# Patient Record
Sex: MALE | Race: WHITE | URBAN - METROPOLITAN AREA
[De-identification: names, ages, dates, MRNs, and addresses within clinical notes are randomized per-mention and may not be internally consistent; named-entity substitution may affect disease eponyms.]

---

## 2017-01-06 DIAGNOSIS — J30.9 ALLERGIC RHINITIS: Primary | ICD-10-CM

## 2017-01-06 RX ORDER — CETIRIZINE HYDROCHLORIDE 10 MG/1
10 TABLET ORAL EVERY EVENING
Qty: 90 TABLET | Refills: 2 | Status: CANCELLED | OUTPATIENT
Start: 2017-01-06

## 2017-01-06 NOTE — TELEPHONE ENCOUNTER
zyrtec      Last Written Prescription Date: 07/18/16  Last Fill Quantity: 90,  # refills: 2   Last Office Visit with [facility], [facility] or [facility] prescribing provider: 08/09/16                                         Next 5 appointments (look out 90 days)     Pino 10, 2017 11:30 AM   Return Visit with Ozzy Delvalle MD   [facility]    [address] [place] [id number]   [phone]            Mar 20, 2017  3:40 PM   PHYSICAL with Susie Cordova MD   [facility]    [address] [place] [address] [address]  [facility] [id number] [phone]

## 2017-01-17 ENCOUNTER — TELEPHONE (OUTPATIENT)
Dept: FAMILY MEDICINE | Facility: OTHER | Age: 22
End: 2017-01-17

## 2017-01-17 NOTE — TELEPHONE ENCOUNTER
Logan Pittman is a 21 year old male-   Evangelina calls from patient's group home requesting a copy of patient's recent allergy testing. Per note in chart, results were mailed to group home, but she states that they haven't received anything. This testing was completed on 12/21/2016.   Please fax all allergy results to [phone].  Attn: Evangelina  Thank you,   Yennifer Templeton RN

## 2017-01-17 NOTE — TELEPHONE ENCOUNTER
Reason for call:  Patient reporting a symptom    Symptom or request: Back Pain    Duration (how long have symptoms been present): 1/14/2017    Have you been treated for this before? No    Additional comments: Pt group home representative states pt is requesting to be seen by a Chiropractic office due to back pain.  Inquiring if pt needs a referral or needs to be seen by PCP first.  PT states he slept wrong.    Phone Number patient can be reached at:  Other phone number: [phone]-Evangelina at [facility]     Best Time:  7am-3pm Monday-Friday    Can we leave a detailed message on this number:  YES    Call taken on 1/17/2017 at 11:23 AM by Azul Bronson

## 2017-01-17 NOTE — TELEPHONE ENCOUNTER
"Logan Pittman is a 21 year old male-     NURSING ASSESSMENT:  Description:  Evangelina from patient's group home calls. Consent on file to communicate with her is in chart. Patient complaining this am of lower back pain. He states that he \"slept wrong\" last night. He is up walking and doing things as usual without difficulty. He has not requested anything OTC for this pain, but mentioned that he would like to go to a chiropractor. Evangelina calls wondering if he needs a referral. Patient denies chest pain, difficulty breathing or severe back pain. Onset/duration:  1 day   Precip. factors:  Unknown   Associated symptoms:  None   Pain scale (0-10)  Patient does not rate   Last exam/Treatment:  8/9/2016  Allergies:   Allergies   Allergen Reactions     Cats      Food      Potatoes, most raw vegetables     Peanuts [Nuts]      Seasonal Allergies      NURSING PLAN: Nursing advice to patient Contact insurance to see if referral is required. Will send to provider if needed.     RECOMMENDED DISPOSITION:  Home care advice - *Light activities for 2 to 3 days.  *Take your usual pain medication for discomfort and follow instructions on the label.  *Use moist heat (shower tub, moist hot towels) for 20 to 30 min every 2 hours for 48 hours but only while awake.  REPORT THE FOLLOW PROBLEMS TO YOUR PCP?CLINIC?ED  *No improvement in 3 days.  *Pain worsens *pain radiates into any limb  *Painful urination, frequency, fever, or blood in urine    SEEK EMERGENCY CARE IMMEDIATELY IF ANY OF THE FOLLOWING OCCUR  *New onset of persistent numbness.tingling or persistent loss of bowel of bladder control or inability to urinate  *Weakness in the limbs  *Severe pain  Will comply with recommendation: Yes  If further questions/concerns or if symptoms do not improve, worsen or new symptoms develop, call your PCP or [facility] as soon as possible.      Guideline used: Back Pain   Telephone Triage Protocols for Nurses, Fourth Edition, Leti " Danielito Templeton RN

## 2017-01-24 ENCOUNTER — OFFICE VISIT (OUTPATIENT)
Dept: CARDIOLOGY | Facility: CLINIC | Age: 22
End: 2017-01-24
Attending: INTERNAL MEDICINE
Payer: MEDICAID

## 2017-01-24 VITALS
SYSTOLIC BLOOD PRESSURE: 136 MMHG | BODY MASS INDEX: 29.36 KG/M2 | WEIGHT: 176.21 LBS | OXYGEN SATURATION: 97 % | HEIGHT: 65 IN | HEART RATE: 100 BPM | DIASTOLIC BLOOD PRESSURE: 86 MMHG

## 2017-01-24 DIAGNOSIS — I42.9 SECONDARY CARDIOMYOPATHY (H): ICD-10-CM

## 2017-01-24 DIAGNOSIS — K30 ACID INDIGESTION: Primary | ICD-10-CM

## 2017-01-24 PROCEDURE — 99214 OFFICE O/P EST MOD 30 MIN: CPT | Performed by: INTERNAL MEDICINE

## 2017-01-24 NOTE — Clinical Note
2017      Susie Cordova MD    [facility]    [address], [address]    [place]  [address]       RE: Logan Pittman   MRN: [id number]   : 1995      Dear Susie:      Logan came with one of his aides to follow up his history of perimyocarditis.  It is not exactly clear why he came, but for all that he has been asymptomatic in the past year with nothing to suggest recurrent pericarditis.  He has had a burning feeling in the middle of his chest that comes and goes and is altered by meals and for all effects and purposes sounds like indigestion.  I gave him a prescription for Prilosec 20 mg a day but asked him to follow up with you subsequently.  The symptoms sounded very much like indigestion and heartburn and was improved by eating food.      PHYSICAL EXAMINATION:     GENERAL:  His physical exam is entirely normal.  He looks well and feels well.   VITAL SIGNS:  Blood pressure 136/86, heart rate 90 beats a minute.   HEAD AND NECK:  Normal.  Carotids were equal, no bruits heard.   HEART:  Regular without gallop, murmur, rub or click.   LUNGS:  Clear.   ABDOMEN:  Soft without organomegaly, mass or pain.   EXTREMITIES:  Free of edema.      Logan looks well and is asymptomatic.  I do not feel further followup with regards to Cardiology, perimyocarditis is needed.      I did give him the Prilosec prescription, and he should follow with you regarding ongoing medication of this type.      If he has any problems, let me know.      Thanks for the privilege.  I will see him p.r.n.      Sincerely,      Ozzy Delvalle MD, [facility]

## 2017-01-24 NOTE — MR AVS SNAPSHOT
"              After Visit Summary   1/24/2017    Logan Pittman    MRN: [id number]           Patient Information     Date Of Birth          1995        Visit Information        Provider Department      1/24/2017 1:00 PM Ozzy Delvalle MD [facility]        Today's Diagnoses     Acid indigestion    -  1     Secondary cardiomyopathy (H)            Follow-ups after your visit        Your next 10 appointments already scheduled     Mar 20, 2017  3:40 PM   PHYSICAL with Susie Cordova MD   [facility]    [address] [address] [phone]              Who to contact     If you have questions or need follow up information about today's clinic visit or your schedule please contact [facility] directly at [phone].  Normal or non-critical lab and imaging results will be communicated to you by MyChart, letter or phone within 4 business days after the clinic has received the results. If you do not hear from us within 7 days, please contact the clinic through MyChart or phone. If you have a critical or abnormal lab result, we will notify you by phone as soon as possible.  Submit refill requests through [employer] or call your pharmacy and they will forward the refill request to us. Please allow 3 business days for your refill to be completed.          Additional Information About Your Visit        MyChart Information     [employer] lets you send messages to your doctor, view your test results, renew your prescriptions, schedule appointments and more. To sign up, go to [email or web address] . Click on \"Log in\" on the left side of the screen, which will take you to the Welcome page. Then click on \"Sign up Now\" on the right side of the page.     You will be asked to enter the access code listed below, as well as some personal information. Please follow the directions to create your username and password.     Your access " "code is: [email or web address]  Expires: 3/21/2017  2:47 PM     Your access code will  in 90 days. If you need help or a new code, please call your [place] clinic or [phone].        Care EveryWhere ID     This is your Care EveryWhere ID. This could be used by other organizations to access your [place] medical records  [id number]        Your Vitals Were     Pulse Height BMI (Body Mass Index) Pulse Oximetry          100 1.642 m (5' 4.65\") 29.65 kg/m2 97%         Blood Pressure from Last 3 Encounters:   17 136/86   16 118/76   16 108/50    Weight from Last 3 Encounters:   17 79.928 kg (176 lb 3.4 oz)   16 77.111 kg (170 lb)   16 73.029 kg (161 lb)              We Performed the Following     Follow-Up with Cardiologist          Today's Medication Changes          These changes are accurate as of: 17  1:40 PM.  If you have any questions, ask your nurse or doctor.               Start taking these medicines.        Dose/Directions    omeprazole 20 MG CR capsule   Commonly known as:  priLOSEC   Used for:  Acid indigestion        Dose:  20 mg   Take 1 capsule (20 mg) by mouth daily   Quantity:  30 capsule   Refills:  11            Where to get your medicines      These medications were sent to [facility] [address]  [address] [place] [address] [address], [facility] [address]     Phone:  [phone]    - omeprazole 20 MG CR capsule             Primary Care Provider Office Phone # Fax #    Susie Cordova [phone]       [address]    [facility] [address]        Thank you!     Thank you for choosing [facility]  for your care. Our goal is always to provide you with excellent care. Hearing back from our patients is one way we can continue to improve our services. Please take a few minutes to complete the written survey that you may receive in the mail after your visit with us. Thank " you!             Your Updated Medication List - Protect others around you: Learn how to safely use, store and throw away your medicines at www.disposemymeds.org.          This list is accurate as of: 1/24/17  1:40 PM.  Always use your most recent med list.                   Brand Name Dispense Instructions for use    albuterol 108 (90 BASE) MCG/ACT Inhaler    PROAIR HFA/PROVENTIL HFA/VENTOLIN HFA    1 Inhaler    Inhale 2 puffs into the lungs every 4 hours as needed for shortness of breath / dyspnea or wheezing (cough or chest tightness)       cetirizine 10 MG tablet    zyrTEC    90 tablet    Take 1 tablet (10 mg) by mouth every evening       diphenhydrAMINE 25 MG tablet    BENADRYL    30 tablet    Take 1 tablet (25 mg) by mouth every 6 hours as needed (allergic reaction)       EPINEPHrine 0.3 MG/0.3ML injection     2 each    Inject 0.3 mLs (0.3 mg) into the muscle once as needed for anaphylaxis       fluticasone 50 MCG/ACT spray    FLONASE    3 Package    Spray 2 sprays into both nostrils as needed for rhinitis or allergies       HYDROXYZINE HCL PO      Take 50 mg by mouth       methylphenidate 20 MG CR tablet    METADATE ER    90 tablet    Take 3 tablets (60 mg) by mouth every morning       olopatadine 0.1 % ophthalmic solution    PATANOL    5 mL    Place 1 drop into both eyes 2 times daily       omeprazole 20 MG CR capsule    priLOSEC    30 capsule    Take 1 capsule (20 mg) by mouth daily       order for DME      Equipment being ordered: post op shoe       * SEROQUEL PO      Take 50 mg by mouth At Bedtime       * SEROQUEL 25 MG tablet   Generic drug:  QUEtiapine     30 tablet    Take 1 tablet (25 mg) by mouth nightly as needed       WELLBUTRIN PO      Take 450 mg by mouth daily       ZOLOFT PO      Take 150 mg by mouth daily       * Notice:  This list has 2 medication(s) that are the same as other medications prescribed for you. Read the directions carefully, and ask your doctor or other care provider to review  them with you.

## 2017-01-24 NOTE — PROGRESS NOTES
HPI and Plan:   See dictation        No orders of the defined types were placed in this encounter.     Orders Placed This Encounter   Medications     BuPROPion HCl (WELLBUTRIN PO)     Sig: Take 450 mg by mouth daily     QUEtiapine Fumarate (SEROQUEL PO)     Sig: Take 50 mg by mouth At Bedtime     Sertraline HCl (ZOLOFT PO)     Sig: Take 150 mg by mouth daily     HYDROXYZINE HCL PO     Sig: Take 50 mg by mouth     omeprazole (PRILOSEC) 20 MG CR capsule     Sig: Take 1 capsule (20 mg) by mouth daily     Dispense:  30 capsule     Refill:  11     Medications Discontinued During This Encounter   Medication Reason     FLUoxetine (PROZAC) 20 MG capsule Stopped by Patient         Encounter Diagnoses   Name Primary?     Secondary cardiomyopathy (H)      Acid indigestion Yes       CURRENT MEDICATIONS:  Current Outpatient Prescriptions   Medication Sig Dispense Refill     BuPROPion HCl (WELLBUTRIN PO) Take 450 mg by mouth daily       QUEtiapine Fumarate (SEROQUEL PO) Take 50 mg by mouth At Bedtime       Sertraline HCl (ZOLOFT PO) Take 150 mg by mouth daily       HYDROXYZINE HCL PO Take 50 mg by mouth       omeprazole (PRILOSEC) 20 MG CR capsule Take 1 capsule (20 mg) by mouth daily 30 capsule 11     cetirizine (ZYRTEC) 10 MG tablet Take 1 tablet (10 mg) by mouth every evening 90 tablet 2     fluticasone (FLONASE) 50 MCG/ACT nasal spray Spray 2 sprays into both nostrils as needed for rhinitis or allergies 3 Package 3     olopatadine (PATANOL) 0.1 % ophthalmic solution Place 1 drop into both eyes 2 times daily 5 mL 11     diphenhydrAMINE (BENADRYL) 25 MG tablet Take 1 tablet (25 mg) by mouth every 6 hours as needed (allergic reaction) 30 tablet 2     EPINEPHrine (EPIPEN) 0.3 MG/0.3ML injection Inject 0.3 mLs (0.3 mg) into the muscle once as needed for anaphylaxis 2 each 2     albuterol (PROAIR HFA, PROVENTIL HFA, VENTOLIN HFA) 108 (90 BASE) MCG/ACT inhaler Inhale 2 puffs into the lungs every 4 hours as needed for shortness of  breath / dyspnea or wheezing (cough or chest tightness) 1 Inhaler 2     QUEtiapine (SEROQUEL) 25 MG tablet Take 1 tablet (25 mg) by mouth nightly as needed 30 tablet 1     methylphenidate (METADATE ER) 20 MG ER tablet Take 3 tablets (60 mg) by mouth every morning (Patient taking differently: Take 60 mg by mouth every morning ) 90 tablet 0     ORDER FOR DME Equipment being ordered: post op shoe         ALLERGIES     Allergies   Allergen Reactions     Cats      Dogs      Food      Potatoes, most raw vegetables     Peanuts [Nuts]      Seasonal Allergies        PAST MEDICAL HISTORY:  Past Medical History   Diagnosis Date     ADHD      Asperger's syndrome      Mild intermittent asthma      Triggers = environmental (chlorine), seasonal allergies     Food allergies      Multiple, skin and blood testing positive, Dr. Ponce follows     Kidney stone Age 8     Hospitalized for 1 week     Pericarditis 2/15       PAST SURGICAL HISTORY:  Past Surgical History   Procedure Laterality Date     Appendectomy  2003     Hc orchiopexy,ingunial approach  Age 8     with bilateral hernia repair and hydrocele repair       FAMILY HISTORY:  No family history on file.    SOCIAL HISTORY:  Social History     Social History     Marital Status: Single     Spouse Name: N/A     Number of Children: N/A     Years of Education: N/A     Social History Main Topics     Smoking status: Current Every Day Smoker -- 0.25 packs/day     Smokeless tobacco: Never Used      Comment: rolls his own     Alcohol Use: No     Drug Use: No     Sexual Activity:     Partners: Female     Birth Control/ Protection: Condom     Other Topics Concern     Not on file     Social History Narrative         Review of Systems:  Skin:  Negative       Eyes:  Negative      ENT:  Negative      Respiratory:  Negative       Cardiovascular:  Negative for;palpitations;edema;lightheadedness;dizziness Positive for;chest pain throw up feeling and burning thinks heart burn   Gastroenterology:  "Positive for heartburn    Genitourinary:  Negative      Musculoskeletal:  Positive for back pain trying to get into the chiropractor  Neurologic:  Negative      Psychiatric:  Positive for sleep disturbances medications are helping   Heme/Lymph/Imm:  Positive for allergies    Endocrine:  Negative        Physical Exam:  Vitals: /86 mmHg  Pulse 100  Ht 1.642 m (5' 4.65\")  Wt 79.928 kg (176 lb 3.4 oz)  BMI 29.65 kg/m2  SpO2 97%    Constitutional:  cooperative, alert and oriented, well developed, well nourished, in no acute distress        Skin:  warm and dry to the touch        Head:  normocephalic        Eyes:  pupils equal and round;sclera white        ENT:  no pallor or cyanosis        Neck:  carotid pulses are full and equal bilaterally;JVP normal;no carotid bruit        Chest:  clear to auscultation          Cardiac: regular rhythm;normal S1 and S2;no murmurs, gallops or rubs detected;no S3 or S4                  Abdomen:  abdomen soft;no masses;non-tender        Vascular: pulses full and equal                                        Extremities and Back:  no deformities, clubbing, cyanosis, erythema observed              Neurological:  affect appropriate, oriented to time, person and place;no gross motor deficits          Recent Lab Results:  LIPID RESULTS:  Lab Results   Component Value Date    CHOL 171 02/23/2015    HDL 40* 02/23/2015     02/23/2015    TRIG 138 02/23/2015    CHOLHDLRATIO 4.2 02/23/2015       LIVER ENZYME RESULTS:  Lab Results   Component Value Date    AST 34 04/23/2015    * 04/23/2015       CBC RESULTS:  Lab Results   Component Value Date    WBC 11.1* 04/23/2015    RBC 5.28 04/23/2015    HGB 15.4 04/23/2015    HCT 44.6 04/23/2015    MCV 85 04/23/2015    MCH 29.2 04/23/2015    MCHC 34.5 04/23/2015    RDW 12.9 04/23/2015     04/23/2015       BMP RESULTS:  Lab Results   Component Value Date     04/23/2015    POTASSIUM 3.7 04/23/2015    CHLORIDE 103 04/23/2015    " CO2 27 04/23/2015    ANIONGAP 7 04/23/2015    GLC 92 04/23/2015    BUN 16 04/23/2015    CR 0.79 04/23/2015    GFRESTIMATED >90  Non  GFR Calc   04/23/2015    GFRESTBLACK >90   GFR Calc   04/23/2015    RUSS 8.8 04/23/2015        A1C RESULTS:  No results found for: A1C    INR RESULTS:  No results found for: INR        CC  Ozzy Delvalle MD   [facility]  [address]  [place] [id number]

## 2017-01-25 NOTE — PROGRESS NOTES
SUBJECTIVE:                                                    Logan Pittman is a 22 year old male who presents to clinic today for the following health issues:      HPI    1) split on lip    GERD/Heartburn     Onset: x 1.5 years      Description:     Burning in chest: YES    Intensity: mild, moderate, severe    Progression of Symptoms: waxing and waning    Accompanying Signs & Symptoms:  Does it feel like food gets stuck: YES  Nausea: no   Vomiting (bloody?): no  Abdominal Pain: no  Black-Tarry stools: no:  Bloody stools: no   History:   Previous ulcers: no    Precipitating factors:   Caffeine use: no  Alcohol use: no  NSAID/Aspirin use: YES- I take it for headaches   Tobacco use: YES  Worse with peppermint and sour foods.    Alleviating factors:  Greasy stuff          Therapies Tried and outcome:liquid antacid    Acute low back pain - 1 week.   Problem list and histories reviewed & adjusted, as indicated.  Additional history: as documented      Patient Active Problem List   Diagnosis     Intermittent asthma, uncomplicated     Food allergies     ADHD (attention deficit hyperactivity disorder)     Asperger's syndrome     Allergic rhinitis     Seasonal allergic rhinitis, unspecified allergic rhinitis trigger     Tobacco abuse     OCD (obsessive compulsive disorder)     H/O pericarditis     Secondary cardiomyopathy (H)     Peanut allergy     Oral allergy syndrome, initial encounter     Gastroesophageal reflux disease without esophagitis     Muscle spasm     Past Surgical History   Procedure Laterality Date     Appendectomy  2003     Hc orchiopexy,ingunial approach  Age 8     with bilateral hernia repair and hydrocele repair       Social History   Substance Use Topics     Smoking status: Current Every Day Smoker -- 0.25 packs/day     Smokeless tobacco: Never Used      Comment: rolls his own     Alcohol Use: No     History reviewed. No pertinent family history.      Current Outpatient Prescriptions   Medication Sig  Dispense Refill     BuPROPion HCl (WELLBUTRIN PO) Take 450 mg by mouth daily       QUEtiapine Fumarate (SEROQUEL PO) Take 50 mg by mouth At Bedtime       Sertraline HCl (ZOLOFT PO) Take 150 mg by mouth daily       HYDROXYZINE HCL PO Take 50 mg by mouth       omeprazole (PRILOSEC) 20 MG CR capsule Take 1 capsule (20 mg) by mouth daily 30 capsule 11     cetirizine (ZYRTEC) 10 MG tablet Take 1 tablet (10 mg) by mouth every evening 90 tablet 2     fluticasone (FLONASE) 50 MCG/ACT nasal spray Spray 2 sprays into both nostrils as needed for rhinitis or allergies 3 Package 3     olopatadine (PATANOL) 0.1 % ophthalmic solution Place 1 drop into both eyes 2 times daily 5 mL 11     diphenhydrAMINE (BENADRYL) 25 MG tablet Take 1 tablet (25 mg) by mouth every 6 hours as needed (allergic reaction) 30 tablet 2     QUEtiapine (SEROQUEL) 25 MG tablet Take 1 tablet (25 mg) by mouth nightly as needed 30 tablet 1     methylphenidate (METADATE ER) 20 MG ER tablet Take 3 tablets (60 mg) by mouth every morning (Patient taking differently: Take 60 mg by mouth every morning ) 90 tablet 0     EPINEPHrine (EPIPEN) 0.3 MG/0.3ML injection Inject 0.3 mLs (0.3 mg) into the muscle once as needed for anaphylaxis 2 each 2     albuterol (PROAIR HFA, PROVENTIL HFA, VENTOLIN HFA) 108 (90 BASE) MCG/ACT inhaler Inhale 2 puffs into the lungs every 4 hours as needed for shortness of breath / dyspnea or wheezing (cough or chest tightness) 1 Inhaler 2     ORDER FOR DME Equipment being ordered: post op shoe       Allergies   Allergen Reactions     Cats      Dogs      Food      Potatoes, most raw vegetables     Peanuts [Nuts]      Seasonal Allergies      BP Readings from Last 3 Encounters:   01/26/17 100/60   01/24/17 136/86   12/21/16 118/76    Wt Readings from Last 3 Encounters:   01/26/17 175 lb (79.379 kg)   01/24/17 176 lb 3.4 oz (79.928 kg)   12/21/16 170 lb (77.111 kg)                  Labs reviewed in EPIC  Problem list, Medication list, Allergies,  and Medical/Social/Surgical histories reviewed in [facility] and updated as appropriate.    ROS:  Constitutional, HEENT, cardiovascular, pulmonary, gi and gu systems are negative, except as otherwise noted.    OBJECTIVE:                                                    /60 mmHg  Pulse 90  Temp(Src) 98  F (36.7  C) (Temporal)  Resp 14  Ht   Wt 175 lb (79.379 kg)  Body mass index is 29.44 kg/(m^2).  Physical Exam   Constitutional: He appears well-developed and well-nourished.   HENT:   Head: Normocephalic and atraumatic.   Cardiovascular: Normal rate and regular rhythm.    Pulmonary/Chest: Effort normal and breath sounds normal.   Abdominal: Soft. Bowel sounds are normal. He exhibits no distension and no mass. There is no tenderness. There is no rebound and no guarding.   Musculoskeletal:   egative straight leg price test. Normal reflexes and strength in bilateral lower extremities. Tenderness to palpation rightparaspinal muscles   Psychiatric: He has a normal mood and affect.         Diagnostic Test Results:  none      ASSESSMENT/PLAN:                                                      Problem List Items Addressed This Visit     Gastroesophageal reflux disease without esophagitis     Symptoms are very consistent with gastric reflux  Disease.  Current precipitating factors include tobacco use, obesity, eating habits  Discussed diet and lifestyle modifications.  He is not interested in quitting smoking today.  ContinuePPI for the next 6-12 weeks.  Recheckin 2 months for a physical.         Muscle spasm     Patient is also here to discuss low back pain  Which has been present for about a week now.  Denies any current injuries.  Exam is consistent with acute muscle spasm of the  Paraspinal muscles in the lumbar area  Discussed heat, low back stretches, Tylenol as needed for pain    Offered muscle relaxants. Patient declined.  Reportable signs and symptoms discussed.  We'll evaluate further if symptoms persist or  fail to resolve           Other Visit Diagnoses     Tobacco use disorder    -  Primary     Relevant Orders     TOBACCO CESSATION ORDER FOR [facility] (Completed)     Need for prophylactic vaccination with tetanus-diphtheria (TD)                Tobacco Cessation:   reports that he has been smoking.  He has never used smokeless tobacco.  Tobacco Cessation Action Plan: Information offered: Patient not interested at this time      See Patient Instructions    Susie Cordova MD  [facility]

## 2017-01-25 NOTE — PROGRESS NOTES
2017      Susie Cordova MD    [facility]    [address], [address]    [place]  [address]       RE: Clau Christianson   MRN: [id number]   : 1995      Dear Susie:      Clau came with one of his aides to follow up his history of perimyocarditis.  It is not exactly clear why he came, but for all that he has been asymptomatic in the past year with nothing to suggest recurrent pericarditis.  He has had a burning feeling in the middle of his chest that comes and goes and is altered by meals and for all effects and purposes sounds like indigestion.  I gave him a prescription for Prilosec 20 mg a day but asked him to follow up with you subsequently.  The symptoms sounded very much like indigestion and heartburn and was improved by eating food.      PHYSICAL EXAMINATION:     GENERAL:  His physical exam is entirely normal.  He looks well and feels well.   VITAL SIGNS:  Blood pressure 136/86, heart rate 90 beats a minute.   HEAD AND NECK:  Normal.  Carotids were equal, no bruits heard.   HEART:  Regular without gallop, murmur, rub or click.   LUNGS:  Clear.   ABDOMEN:  Soft without organomegaly, mass or pain.   EXTREMITIES:  Free of edema.      Clau looks well and is asymptomatic.  I do not feel further followup with regards to Cardiology, perimyocarditis is needed.      I did give him the Prilosec prescription, and he should follow with you regarding ongoing medication of this type.      If he has any problems, let me know.      Thanks for the privilege.  I will see him p.r.n.      Sincerely,            Carmela Delvalle MD, FACC         CARMELA DELVALLE MD, FACC             D: 2017 13:41   T: 2017 20:26   MT: BREANNA      Name:     CLAU CHRISTIANSON   MRN:      [id number]        Account:      [id number]   :      1995           Service Date: 2017      Document: [id number]

## 2017-01-26 ENCOUNTER — OFFICE VISIT (OUTPATIENT)
Dept: FAMILY MEDICINE | Facility: OTHER | Age: 22
End: 2017-01-26
Payer: MEDICAID

## 2017-01-26 VITALS
WEIGHT: 175 LBS | BODY MASS INDEX: 29.44 KG/M2 | RESPIRATION RATE: 14 BRPM | SYSTOLIC BLOOD PRESSURE: 100 MMHG | HEART RATE: 90 BPM | TEMPERATURE: 98 F | DIASTOLIC BLOOD PRESSURE: 60 MMHG

## 2017-01-26 DIAGNOSIS — K21.9 GASTROESOPHAGEAL REFLUX DISEASE WITHOUT ESOPHAGITIS: ICD-10-CM

## 2017-01-26 DIAGNOSIS — M62.838 MUSCLE SPASM: ICD-10-CM

## 2017-01-26 DIAGNOSIS — Z23 NEED FOR PROPHYLACTIC VACCINATION WITH TETANUS-DIPHTHERIA (TD): ICD-10-CM

## 2017-01-26 DIAGNOSIS — F17.200 TOBACCO USE DISORDER: Primary | ICD-10-CM

## 2017-01-26 PROCEDURE — 99214 OFFICE O/P EST MOD 30 MIN: CPT | Performed by: FAMILY MEDICINE

## 2017-01-26 ASSESSMENT — PAIN SCALES - GENERAL: PAINLEVEL: NO PAIN (0)

## 2017-01-26 NOTE — NURSING NOTE
"Chief Complaint   Patient presents with     Gastrophageal Reflux     Panel Management     Tdap, Tobacco cessation. Depression on problem list, DAP, PHQ/HASMUKH       Initial /60 mmHg  Pulse 90  Temp(Src) 98  F (36.7  C) (Temporal)  Resp 14  Ht   Wt 175 lb (79.379 kg) Estimated body mass index is 29.44 kg/(m^2) as calculated from the following:    Height as of 1/24/17: 5' 4.65\" (1.642 m).    Weight as of this encounter: 175 lb (79.379 kg).  BP completed using cuff size: regular/long  Kitty Raygoza CMA    "

## 2017-01-26 NOTE — ASSESSMENT & PLAN NOTE
Patient is also here to discuss low back pain  Which has been present for about a week now.  Denies any current injuries.  Exam is consistent with acute muscle spasm of the  Paraspinal muscles in the lumbar area  Discussed heat, low back stretches, Tylenol as needed for pain    Offered muscle relaxants. Patient declined.  Reportable signs and symptoms discussed.  We'll evaluate further if symptoms persist or fail to resolve

## 2017-01-26 NOTE — PATIENT INSTRUCTIONS
Tips to Control Acid Reflux  To control acid reflux, you ll need to make some basic diet and lifestyle changes. The simple steps outlined below may be all you ll need to relieve discomfort.  Watch What You Eat      Avoid fatty foods and spicy foods.    Eat fewer acidic foods, such as citrus and tomato-based foods. These can increase symptoms.    Limit drinking alcohol, caffeine, and fizzy beverages. All increase acid reflux.    Try limiting chocolate, peppermint, and spearmint. These can worsen acid reflux in some people.  Watch When You Eat    Avoid lying down for 3 hours after eating.    Do not snack before going to bed.  Raise Your Head    Raising your head and upper body by 4 inches to 6 inches helps limit reflux when you re lying down. Put blocks under the head of the bed frame to raise it.  Other Changes    Lose weight, if you need to.    Don t work out near bedtime.    Avoid tight-fitting clothes.    Limit aspirin and ibuprofen.    Stop smoking.     4937-3793 [employer]. [address] [place] [address], [place] [id number]. All rights reserved. This information is not intended as a substitute for professional medical care. Always follow your healthcare professional's instructions.        What Is Acid Reflux?    Do you have to clear your throat or cough often? Are you hoarse? Do you have difficulty swallowing? If you have these or other throat symptoms, you may have acid reflux (when stomach acid washes up and irritates your throat).  Why You Have Throat Symptoms  At both ends of the esophagus (the tube that carries food to the stomach) are the esophageal sphincters. These muscles relax to let food pass, then tighten to keep stomach acid down. When the lower esophageal sphincter (LES) doesn t tighten enough, acid can reflux from the stomach into the esophagus. This may cause heartburn. If the upper esophageal sphincter (UES) also doesn t work well, acid can travel higher and enter your throat (pharynx).  In many cases, this causes throat symptoms.  Common Throat Symptoms    Frequent need to clear your throat    Feeling like you re choking    Chronic cough    Hoarseness    Trouble swallowing    Sensation of having  a lump in the throat     Sour or acid taste    Recurrent sore throat     8930-5120 [employer]. [address] [place] [address]. All rights reserved. This information is not intended as a substitute for professional medical care. Always follow your healthcare professional's instructions.        Lifestyle Changes for Controlling GERD  When you have GERD, stomach acid feels as if it s backing up toward your mouth. Whether or not you take medication to control your GERD, your symptoms can often be improved with lifestyle changes. Talk to your doctor about the following suggestions, which may help you get relief from your symptoms.  Raise Your Head    Reflux is more likely to strike when you re lying down flat, because stomach fluid can flow backward more easily. Raising the head of your bed 4 to 6 inches can help. To do this:    Slide blocks or books under the legs at the head of your bed. Or, place a wedge under the mattress. Many [employer] can make a suitable wedge for you. The wedge should run from your waist to the top of your head.    Don t just prop your head on several pillows. This increases pressure on your stomach. It can make GERD worse.  Watch Your Eating Habits  Certain foods may increase the acid in your stomach or relax the lower esophageal sphincter, making GERD more likely. It s best to avoid the following:    Coffee, tea, and carbonated drinks (with and without caffeine)    Fatty, fried, or spicy food    Mint, chocolate, onions, and tomatoes    Any other foods that seem to irritate your stomach or cause you pain  Relieve the Pressure    Eat smaller meals, even if you have to eat more often.    Don t lie down right after you eat. Wait a few hours for your stomach to  empty.    Avoid tight belts and tight-fitting clothes.    Lose excess weight.  Tobacco and Alcohol  Avoid smoking tobacco and drinking alcohol. They can make GERD symptoms worse.    9634-6500 [employer]. [address] [place] [address], [place] [address]. All rights reserved. This information is not intended as a substitute for professional medical care. Always follow your healthcare professional's instructions.

## 2017-01-26 NOTE — ASSESSMENT & PLAN NOTE
Symptoms are very consistent with gastric reflux  Disease.  Current precipitating factors include tobacco use, obesity, eating habits  Discussed diet and lifestyle modifications.  He is not interested in quitting smoking today.  ContinuePPI for the next 6-12 weeks.  Recheckin 2 months for a physical.

## 2017-01-26 NOTE — MR AVS SNAPSHOT
After Visit Summary   1/26/2017    Logan Pittman    MRN: [id number]           Patient Information     Date Of Birth          1995        Visit Information        Provider Department      1/26/2017 4:40 PM Susie Cordova MD [facility]        Today's Diagnoses     Tobacco use disorder    -  1     Need for prophylactic vaccination with tetanus-diphtheria (TD)           Care Instructions      Tips to Control Acid Reflux  To control acid reflux, you ll need to make some basic diet and lifestyle changes. The simple steps outlined below may be all you ll need to relieve discomfort.  Watch What You Eat      Avoid fatty foods and spicy foods.    Eat fewer acidic foods, such as citrus and tomato-based foods. These can increase symptoms.    Limit drinking alcohol, caffeine, and fizzy beverages. All increase acid reflux.    Try limiting chocolate, peppermint, and spearmint. These can worsen acid reflux in some people.  Watch When You Eat    Avoid lying down for 3 hours after eating.    Do not snack before going to bed.  Raise Your Head    Raising your head and upper body by 4 inches to 6 inches helps limit reflux when you re lying down. Put blocks under the head of the bed frame to raise it.  Other Changes    Lose weight, if you need to.    Don t work out near bedtime.    Avoid tight-fitting clothes.    Limit aspirin and ibuprofen.    Stop smoking.     1145-3772 [employer]. [address] [place] [address], [place] [address]. All rights reserved. This information is not intended as a substitute for professional medical care. Always follow your healthcare professional's instructions.        What Is Acid Reflux?    Do you have to clear your throat or cough often? Are you hoarse? Do you have difficulty swallowing? If you have these or other throat symptoms, you may have acid reflux (when stomach acid washes up and irritates your throat).  Why You Have Throat Symptoms  At both ends of the  esophagus (the tube that carries food to the stomach) are the esophageal sphincters. These muscles relax to let food pass, then tighten to keep stomach acid down. When the lower esophageal sphincter (LES) doesn t tighten enough, acid can reflux from the stomach into the esophagus. This may cause heartburn. If the upper esophageal sphincter (UES) also doesn t work well, acid can travel higher and enter your throat (pharynx). In many cases, this causes throat symptoms.  Common Throat Symptoms    Frequent need to clear your throat    Feeling like you re choking    Chronic cough    Hoarseness    Trouble swallowing    Sensation of having  a lump in the throat     Sour or acid taste    Recurrent sore throat     0700-9544 The [employer]. [address] [place] [address], [place] [address]. All rights reserved. This information is not intended as a substitute for professional medical care. Always follow your healthcare professional's instructions.        Lifestyle Changes for Controlling GERD  When you have GERD, stomach acid feels as if it s backing up toward your mouth. Whether or not you take medication to control your GERD, your symptoms can often be improved with lifestyle changes. Talk to your doctor about the following suggestions, which may help you get relief from your symptoms.  Raise Your Head    Reflux is more likely to strike when you re lying down flat, because stomach fluid can flow backward more easily. Raising the head of your bed 4 to 6 inches can help. To do this:    Slide blocks or books under the legs at the head of your bed. Or, place a wedge under the mattress. Many foam stores can make a suitable wedge for you. The wedge should run from your waist to the top of your head.    Don t just prop your head on several pillows. This increases pressure on your stomach. It can make GERD worse.  Watch Your Eating Habits  Certain foods may increase the acid in your stomach or relax the lower esophageal  sphincter, making GERD more likely. It s best to avoid the following:    Coffee, tea, and carbonated drinks (with and without caffeine)    Fatty, fried, or spicy food    Mint, chocolate, onions, and tomatoes    Any other foods that seem to irritate your stomach or cause you pain  Relieve the Pressure    Eat smaller meals, even if you have to eat more often.    Don t lie down right after you eat. Wait a few hours for your stomach to empty.    Avoid tight belts and tight-fitting clothes.    Lose excess weight.  Tobacco and Alcohol  Avoid smoking tobacco and drinking alcohol. They can make GERD symptoms worse.    2587-0120 [employer]. [address] [place] [address]. All rights reserved. This information is not intended as a substitute for professional medical care. Always follow your healthcare professional's instructions.              Follow-ups after your visit        Follow-up notes from your care team     Return in about 8 weeks (around 3/26/2017) for Physical Exam.      Your next 10 appointments already scheduled     Mar 20, 2017  3:40 PM   [facility]   [facility]    [address] [place] [id number] [phone]              Who to contact     If you have questions or need follow up information about today's clinic visit or your schedule please contact [facility] directly at [phone].  Normal or non-critical lab and imaging results will be communicated to you by MyChart, letter or phone within 4 business days after the clinic has received the results. If you do not hear from us within 7 days, please contact the clinic through MyChart or phone. If you have a critical or abnormal lab result, we will notify you by phone as soon as possible.  Submit refill requests through [employer] or call your pharmacy and they will forward the refill request to us. Please allow 3 business days for your  "refill to be completed.          Additional Information About Your Visit        [employer] Information     [employer] lets you send messages to your doctor, view your test results, renew your prescriptions, schedule appointments and more. To sign up, go to [email or web address] . Click on \"Log in\" on the left side of the screen, which will take you to the Welcome page. Then click on \"Sign up Now\" on the right side of the page.     You will be asked to enter the access code listed below, as well as some personal information. Please follow the directions to create your username and password.     Your access code is: [email or web address]  Expires: 3/21/2017  2:47 PM     Your access code will  in 90 days. If you need help or a new code, please call your [facility] clinic or [phone].        Care EveryWhere ID     This is your [facility] EveryWhere ID. This could be used by other organizations to access your [facility] medical records  [id number]        Your Vitals Were     Pulse Temperature Respirations             90 98  F (36.7  C) (Temporal) 14          Blood Pressure from Last 3 Encounters:   17 100/60   17 136/86   16 118/76    Weight from Last 3 Encounters:   17 175 lb (79.379 kg)   17 176 lb 3.4 oz (79.928 kg)   16 170 lb (77.111 kg)              We Performed the Following     TOBACCO CESSATION ORDER FOR         Primary Care Provider Office Phone # Fax #    Susie Cordova [phone]       [facility] [address]    [facility] [address]        Thank you!     Thank you for choosing [facility]  for your care. Our goal is always to provide you with excellent care. Hearing back from our patients is one way we can continue to improve our services. Please take a few minutes to complete the written survey that you may receive in the mail after your visit with us. Thank you!             Your Updated Medication List - Protect others " around you: Learn how to safely use, store and throw away your medicines at www.disposemymeds.org.          This list is accurate as of: 1/26/17  5:09 PM.  Always use your most recent med list.                   Brand Name Dispense Instructions for use    albuterol 108 (90 BASE) MCG/ACT Inhaler    PROAIR HFA/PROVENTIL HFA/VENTOLIN HFA    1 Inhaler    Inhale 2 puffs into the lungs every 4 hours as needed for shortness of breath / dyspnea or wheezing (cough or chest tightness)       cetirizine 10 MG tablet    zyrTEC    90 tablet    Take 1 tablet (10 mg) by mouth every evening       diphenhydrAMINE 25 MG tablet    BENADRYL    30 tablet    Take 1 tablet (25 mg) by mouth every 6 hours as needed (allergic reaction)       EPINEPHrine 0.3 MG/0.3ML injection     2 each    Inject 0.3 mLs (0.3 mg) into the muscle once as needed for anaphylaxis       fluticasone 50 MCG/ACT spray    FLONASE    3 Package    Spray 2 sprays into both nostrils as needed for rhinitis or allergies       HYDROXYZINE HCL PO      Take 50 mg by mouth       methylphenidate 20 MG CR tablet    METADATE ER    90 tablet    Take 3 tablets (60 mg) by mouth every morning       olopatadine 0.1 % ophthalmic solution    PATANOL    5 mL    Place 1 drop into both eyes 2 times daily       omeprazole 20 MG CR capsule    priLOSEC    30 capsule    Take 1 capsule (20 mg) by mouth daily       order for DME      Equipment being ordered: post op shoe       * SEROQUEL PO      Take 50 mg by mouth At Bedtime       * SEROQUEL 25 MG tablet   Generic drug:  QUEtiapine     30 tablet    Take 1 tablet (25 mg) by mouth nightly as needed       WELLBUTRIN PO      Take 450 mg by mouth daily       ZOLOFT PO      Take 150 mg by mouth daily       * Notice:  This list has 2 medication(s) that are the same as other medications prescribed for you. Read the directions carefully, and ask your doctor or other care provider to review them with you.

## 2017-01-27 ASSESSMENT — ANXIETY QUESTIONNAIRES
7. FEELING AFRAID AS IF SOMETHING AWFUL MIGHT HAPPEN: MORE THAN HALF THE DAYS
6. BECOMING EASILY ANNOYED OR IRRITABLE: NEARLY EVERY DAY
5. BEING SO RESTLESS THAT IT IS HARD TO SIT STILL: SEVERAL DAYS
GAD7 TOTAL SCORE: 13
1. FEELING NERVOUS, ANXIOUS, OR ON EDGE: MORE THAN HALF THE DAYS
IF YOU CHECKED OFF ANY PROBLEMS ON THIS QUESTIONNAIRE, HOW DIFFICULT HAVE THESE PROBLEMS MADE IT FOR YOU TO DO YOUR WORK, TAKE CARE OF THINGS AT HOME, OR GET ALONG WITH OTHER PEOPLE: SOMEWHAT DIFFICULT
2. NOT BEING ABLE TO STOP OR CONTROL WORRYING: SEVERAL DAYS
3. WORRYING TOO MUCH ABOUT DIFFERENT THINGS: SEVERAL DAYS

## 2017-01-27 ASSESSMENT — PATIENT HEALTH QUESTIONNAIRE - PHQ9: 5. POOR APPETITE OR OVEREATING: NEARLY EVERY DAY

## 2017-01-28 ASSESSMENT — ANXIETY QUESTIONNAIRES: GAD7 TOTAL SCORE: 13

## 2017-01-28 ASSESSMENT — PATIENT HEALTH QUESTIONNAIRE - PHQ9: SUM OF ALL RESPONSES TO PHQ QUESTIONS 1-9: 13

## 2017-03-06 ENCOUNTER — HOSPITAL ENCOUNTER (EMERGENCY)
Facility: CLINIC | Age: 22
Discharge: HOME | End: 2017-03-06
Attending: EMERGENCY MEDICINE | Admitting: EMERGENCY MEDICINE
Payer: MEDICAID

## 2017-03-06 VITALS
DIASTOLIC BLOOD PRESSURE: 86 MMHG | TEMPERATURE: 98 F | BODY MASS INDEX: 29.44 KG/M2 | RESPIRATION RATE: 14 BRPM | WEIGHT: 175 LBS | OXYGEN SATURATION: 99 % | SYSTOLIC BLOOD PRESSURE: 124 MMHG

## 2017-03-06 DIAGNOSIS — R07.89 ATYPICAL CHEST PAIN: ICD-10-CM

## 2017-03-06 LAB
ALBUMIN SERPL-MCNC: 3.9 G/DL (ref 3.4–5)
ALP SERPL-CCNC: 91 U/L (ref 40–150)
ALT SERPL W P-5'-P-CCNC: 34 U/L (ref 0–70)
ANION GAP SERPL CALCULATED.3IONS-SCNC: 9 MMOL/L (ref 3–14)
APTT PPP: 25 SEC (ref 22–37)
AST SERPL W P-5'-P-CCNC: 19 U/L (ref 0–45)
BASOPHILS # BLD AUTO: 0 10E9/L (ref 0–0.2)
BASOPHILS NFR BLD AUTO: 0.4 %
BILIRUB SERPL-MCNC: 0.3 MG/DL (ref 0.2–1.3)
BUN SERPL-MCNC: 12 MG/DL (ref 7–30)
CALCIUM SERPL-MCNC: 8.8 MG/DL (ref 8.5–10.1)
CHLORIDE SERPL-SCNC: 105 MMOL/L (ref 94–109)
CO2 SERPL-SCNC: 27 MMOL/L (ref 20–32)
CREAT SERPL-MCNC: 0.97 MG/DL (ref 0.66–1.25)
CRP SERPL-MCNC: 4.1 MG/L (ref 0–8)
D DIMER PPP FEU-MCNC: 0.3 UG/ML FEU (ref 0–0.5)
DIFFERENTIAL METHOD BLD: NORMAL
EOSINOPHIL # BLD AUTO: 0.2 10E9/L (ref 0–0.7)
EOSINOPHIL NFR BLD AUTO: 2.6 %
ERYTHROCYTE [DISTWIDTH] IN BLOOD BY AUTOMATED COUNT: 13.5 % (ref 10–15)
ERYTHROCYTE [SEDIMENTATION RATE] IN BLOOD BY WESTERGREN METHOD: 8 MM/H (ref 0–15)
GFR SERPL CREATININE-BSD FRML MDRD: ABNORMAL ML/MIN/1.7M2
GLUCOSE SERPL-MCNC: 106 MG/DL (ref 70–99)
HCT VFR BLD AUTO: 47.3 % (ref 40–53)
HGB BLD-MCNC: 16.7 G/DL (ref 13.3–17.7)
IMM GRANULOCYTES # BLD: 0 10E9/L (ref 0–0.4)
IMM GRANULOCYTES NFR BLD: 0.2 %
INR PPP: 0.97 (ref 0.86–1.14)
LYMPHOCYTES # BLD AUTO: 2.1 10E9/L (ref 0.8–5.3)
LYMPHOCYTES NFR BLD AUTO: 24.2 %
MCH RBC QN AUTO: 29.3 PG (ref 26.5–33)
MCHC RBC AUTO-ENTMCNC: 35.3 G/DL (ref 31.5–36.5)
MCV RBC AUTO: 83 FL (ref 78–100)
MONOCYTES # BLD AUTO: 0.7 10E9/L (ref 0–1.3)
MONOCYTES NFR BLD AUTO: 7.7 %
NEUTROPHILS # BLD AUTO: 5.5 10E9/L (ref 1.6–8.3)
NEUTROPHILS NFR BLD AUTO: 64.9 %
PLATELET # BLD AUTO: 343 10E9/L (ref 150–450)
POTASSIUM SERPL-SCNC: 3.8 MMOL/L (ref 3.4–5.3)
PROT SERPL-MCNC: 8.1 G/DL (ref 6.8–8.8)
RBC # BLD AUTO: 5.7 10E12/L (ref 4.4–5.9)
SODIUM SERPL-SCNC: 141 MMOL/L (ref 133–144)
TROPONIN I SERPL-MCNC: NORMAL UG/L (ref 0–0.04)
TSH SERPL DL<=0.005 MIU/L-ACNC: 2.25 MU/L (ref 0.4–4)
WBC # BLD AUTO: 8.5 10E9/L (ref 4–11)

## 2017-03-06 PROCEDURE — 80053 COMPREHEN METABOLIC PANEL: CPT | Performed by: EMERGENCY MEDICINE

## 2017-03-06 PROCEDURE — 86140 C-REACTIVE PROTEIN: CPT | Performed by: EMERGENCY MEDICINE

## 2017-03-06 PROCEDURE — 85610 PROTHROMBIN TIME: CPT | Performed by: EMERGENCY MEDICINE

## 2017-03-06 PROCEDURE — 85379 FIBRIN DEGRADATION QUANT: CPT | Performed by: EMERGENCY MEDICINE

## 2017-03-06 PROCEDURE — 99284 EMERGENCY DEPT VISIT MOD MDM: CPT | Performed by: EMERGENCY MEDICINE

## 2017-03-06 PROCEDURE — 84484 ASSAY OF TROPONIN QUANT: CPT | Performed by: EMERGENCY MEDICINE

## 2017-03-06 PROCEDURE — 85652 RBC SED RATE AUTOMATED: CPT | Performed by: EMERGENCY MEDICINE

## 2017-03-06 PROCEDURE — 84443 ASSAY THYROID STIM HORMONE: CPT | Performed by: EMERGENCY MEDICINE

## 2017-03-06 PROCEDURE — 99284 EMERGENCY DEPT VISIT MOD MDM: CPT

## 2017-03-06 PROCEDURE — 93005 ELECTROCARDIOGRAM TRACING: CPT

## 2017-03-06 PROCEDURE — 85025 COMPLETE CBC W/AUTO DIFF WBC: CPT | Performed by: EMERGENCY MEDICINE

## 2017-03-06 PROCEDURE — 85730 THROMBOPLASTIN TIME PARTIAL: CPT | Performed by: EMERGENCY MEDICINE

## 2017-03-06 RX ORDER — LIDOCAINE 40 MG/G
CREAM TOPICAL
Status: DISCONTINUED | OUTPATIENT
Start: 2017-03-06 | End: 2017-03-06 | Stop reason: HOSPADM

## 2017-03-06 ASSESSMENT — ENCOUNTER SYMPTOMS
PALPITATIONS: 1
SHORTNESS OF BREATH: 1

## 2017-03-06 NOTE — DISCHARGE INSTRUCTIONS
Acute Pain, Uncertain Cause  Pain can be caused by many conditions that range from very minor to very serious. In some cases, though, pain comes and goes with no apparent cause.  We were not able to find the exact cause for your pain. At this time there is no sign of any serious illness causing your pain. More tests may be needed to determine the cause. In many cases, pain like this goes away by itself.  Home care  Take any medicines as prescribed. If another medicine was not prescribed for pain, you can take an over-the-counter pain medicine such as ibuprofen or acetaminophen. Use these as directed on the label.    Follow-up care  Follow up with your healthcare provider or our staff as directed.  When to seek medical advice  Call your healthcare provider for any of the following:    Pain changes in pattern    Pain doesn't lessen or gets worse    New symptoms appear    Fever of 100.4 F (38 C) or higher, or as directed by your healthcare provider    1467-9525 [employer]. [address] [place] [address], [place] [address]. All rights reserved. This information is not intended as a substitute for professional medical care. Always follow your healthcare professional's instructions.

## 2017-03-06 NOTE — ED AVS SNAPSHOT
[facility] Emergency Department    [address] [place] [address]    Phone:  [phone]    Fax:  [phone]                                       Logan Pittman   MRN: [id number]    Department:  [facility] Emergency Department   Date of Visit:  3/6/2017           Patient Information     Date Of Birth          1995        Your diagnoses for this visit were:     Atypical chest pain        You were seen by Timothy Kaplan MD.      Follow-up Information     Follow up with Susie Cordova MD.    Specialty:  Family Practice    Why:  As needed    Contact information:    [facility]  [address]     [place] [place] [address]  [phone]          Discharge Instructions         Acute Pain, Uncertain Cause  Pain can be caused by many conditions that range from very minor to very serious. In some cases, though, pain comes and goes with no apparent cause.  We were not able to find the exact cause for your pain. At this time there is no sign of any serious illness causing your pain. More tests may be needed to determine the cause. In many cases, pain like this goes away by itself.  Home care  Take any medicines as prescribed. If another medicine was not prescribed for pain, you can take an over-the-counter pain medicine such as ibuprofen or acetaminophen. Use these as directed on the label.    Follow-up care  Follow up with your healthcare provider or our staff as directed.  When to seek medical advice  Call your healthcare provider for any of the following:    Pain changes in pattern    Pain doesn't lessen or gets worse    New symptoms appear    Fever of 100.4 F (38 C) or higher, or as directed by your healthcare provider    4476-8990 [employer]. [address] [place] [address]. All rights reserved. This information is not intended as a substitute for professional medical care. Always follow your healthcare professional's  instructions.          Future Appointments        Provider Department Dept Phone Center    3/20/2017 3:40 PM Susie Cordova MD [facility] [phone] [facility]    5/10/2017 1:00 PM CARMELA OLIVO MD [facility] [phone] [facility]      24 Hour Appointment Hotline       To make an appointment at any [facility], call [phone] ([phone]). If you don't have a family doctor or clinic, we will help you find one. [facility] are conveniently located to serve the needs of you and your family.             Review of your medicines      Our records show that you are taking the medicines listed below. If these are incorrect, please call your family doctor or clinic.        Dose / Directions Last dose taken    albuterol 108 (90 BASE) MCG/ACT Inhaler   Commonly known as:  PROAIR HFA/PROVENTIL HFA/VENTOLIN HFA   Dose:  2 puff   Quantity:  1 Inhaler        Inhale 2 puffs into the lungs every 4 hours as needed for shortness of breath / dyspnea or wheezing (cough or chest tightness)   Refills:  2        cetirizine 10 MG tablet   Commonly known as:  zyrTEC   Dose:  10 mg   Quantity:  90 tablet        Take 1 tablet (10 mg) by mouth every evening   Refills:  2        diphenhydrAMINE 25 MG tablet   Commonly known as:  BENADRYL   Dose:  25 mg   Quantity:  30 tablet        Take 1 tablet (25 mg) by mouth every 6 hours as needed (allergic reaction)   Refills:  2        EPINEPHrine 0.3 MG/0.3ML injection   Dose:  0.3 mg   Quantity:  2 each        Inject 0.3 mLs (0.3 mg) into the muscle once as needed for anaphylaxis   Refills:  2        fluticasone 50 MCG/ACT spray   Commonly known as:  FLONASE   Dose:  2 spray   Quantity:  3 Package        Spray 2 sprays into both nostrils as needed for rhinitis or allergies   Refills:  3        HYDROXYZINE HCL PO   Dose:  50 mg   Indication:  50mg and 25mg tab PRN        Take 50 mg by mouth   Refills:  0        methylphenidate 20 MG CR  tablet   Commonly known as:  METADATE ER   Dose:  60 mg   Quantity:  90 tablet        Take 3 tablets (60 mg) by mouth every morning   Refills:  0        olopatadine 0.1 % ophthalmic solution   Commonly known as:  PATANOL   Dose:  1 drop   Quantity:  5 mL        Place 1 drop into both eyes 2 times daily   Refills:  11        omeprazole 20 MG CR capsule   Commonly known as:  priLOSEC   Dose:  20 mg   Quantity:  30 capsule        Take 1 capsule (20 mg) by mouth daily   Refills:  11        order for DME        Equipment being ordered: post op shoe   Refills:  0        * SEROQUEL PO   Dose:  50 mg        Take 50 mg by mouth At Bedtime   Refills:  0        * SEROQUEL 25 MG tablet   Dose:  25 mg   Quantity:  30 tablet   Generic drug:  QUEtiapine        Take 1 tablet (25 mg) by mouth nightly as needed   Refills:  1        WELLBUTRIN PO   Dose:  450 mg        Take 450 mg by mouth daily   Refills:  0        ZOLOFT PO   Dose:  150 mg        Take 150 mg by mouth daily   Refills:  0        * Notice:  This list has 2 medication(s) that are the same as other medications prescribed for you. Read the directions carefully, and ask your doctor or other care provider to review them with you.            Procedures and tests performed during your visit     CBC with platelets differential    CRP inflammation    Comprehensive metabolic panel    D dimer quantitative    EKG 12-lead, tracing only    Erythrocyte sedimentation rate auto    INR    Partial thromboplastin time    Peripheral IV catheter    TSH with free T4 reflex    Troponin I      Orders Needing Specimen Collection     None      Pending Results     Date and Time Order Name Status Description    3/6/2017 1247 CRP inflammation In process     3/6/2017 1247 TSH with free T4 reflex In process             Pending Culture Results     No orders found from 3/4/2017 to 3/7/2017.            Thank you for choosing [facility]       Thank you for choosing [facility] for your care. Our goal is  "always to provide you with excellent care. Hearing back from our patients is one way we can continue to improve our services. Please take a few minutes to complete the written survey that you may receive in the mail after you visit with us. Thank you!        [employer] Information     [employer] lets you send messages to your doctor, view your test results, renew your prescriptions, schedule appointments and more. To sign up, go to [email or web address] . Click on \"Log in\" on the left side of the screen, which will take you to the Welcome page. Then click on \"Sign up Now\" on the right side of the page.     You will be asked to enter the access code listed below, as well as some personal information. Please follow the directions to create your username and password.     Your access code is: [email or web address]  Expires: 3/21/2017  2:47 PM     Your access code will  in 90 days. If you need help or a new code, please call your [facility] clinic or [phone].        Care EveryWhere ID     This is your Care EveryWhere ID. This could be used by other organizations to access your [facility] medical records  [id number]        After Visit Summary       This is your record. Keep this with you and show to your community pharmacist(s) and doctor(s) at your next visit.                  "

## 2017-03-06 NOTE — ED PROVIDER NOTES
"  History     Chief Complaint   Patient presents with     Palpitations     The history is provided by the patient and medical records.     Logan Pittman is a 22 year old male who presents to the ED with Shortness of breath and palpitations. He has a history of pericarditis and is having similar symptoms including shortness of breath and palpitations. The episodes come and go, lasting only a couple of minutes. He has no pain when taking a deep breath. This has been going on for \"awhile.\" He was seen by Ozzy Delvalle MD on 1/24/17, 2 months ago complaining of a burning in his chest. He had thought it was indigestion and was given a prescription of Prilosec, 20 mg. Patient is concerned as the symptoms have not gone away.     Patient Active Problem List   Diagnosis     Intermittent asthma, uncomplicated     Food allergies     ADHD (attention deficit hyperactivity disorder)     Asperger's syndrome     Allergic rhinitis     Seasonal allergic rhinitis, unspecified allergic rhinitis trigger     Tobacco abuse     OCD (obsessive compulsive disorder)     H/O pericarditis     Secondary cardiomyopathy (H)     Peanut allergy     Oral allergy syndrome, initial encounter     Gastroesophageal reflux disease without esophagitis     Muscle spasm     Past Medical History   Diagnosis Date     ADHD      Asperger's syndrome      Food allergies      Multiple, skin and blood testing positive, Dr. Ponce follows     Kidney stone Age 8     Hospitalized for 1 week     Mild intermittent asthma      Triggers = environmental (chlorine), seasonal allergies     Pericarditis 2/15       Past Surgical History   Procedure Laterality Date     Appendectomy  2003     Hc orchiopexy,ingunial approach  Age 8     with bilateral hernia repair and hydrocele repair       No family history on file.    Social History   Substance Use Topics     Smoking status: Current Every Day Smoker     Packs/day: 0.25     Smokeless tobacco: Never Used      Comment: rolls his " own     Alcohol use No        Immunization History   Administered Date(s) Administered     DPT 1995, 1995, 01/10/1995     DTAP (<7y) 02/23/1996, 06/28/1999     HIB 1995, 1995, 01/10/1995     Hepatitis A Vac Ped/Adol-2 Dose 03/16/2015     Hepatitis B 1995, 1995, 01/10/1995     Human Papilloma Virus 04/16/2012, 11/26/2012, 03/17/2014     IPV 1994, 01/10/1995, 06/28/1999     Influenza (H1N1) 11/06/2009     Influenza (IIV3) 10/25/2004, 11/04/2005, 10/10/2007, 12/07/2009, 11/26/2012     Influenza Vaccine IM 3yrs+ 4 Valent IIV4 10/10/2013, 11/17/2014     MMR 02/23/1996, 06/28/1999     Mantoux 11/26/2012, 02/04/2013, 03/17/2014, 03/16/2015, 03/29/2016     Meningococcal (Menactra ) 11/26/2012     OPV 1994     Tdap (Adacel,Boostrix) 08/22/2006          Allergies   Allergen Reactions     Cats      Dogs      Food      Potatoes, most raw vegetables     Peanuts [Nuts]      Seasonal Allergies        Current Outpatient Prescriptions   Medication Sig Dispense Refill     BuPROPion HCl (WELLBUTRIN PO) Take 450 mg by mouth daily       QUEtiapine Fumarate (SEROQUEL PO) Take 50 mg by mouth At Bedtime       Sertraline HCl (ZOLOFT PO) Take 150 mg by mouth daily       omeprazole (PRILOSEC) 20 MG CR capsule Take 1 capsule (20 mg) by mouth daily 30 capsule 11     cetirizine (ZYRTEC) 10 MG tablet Take 1 tablet (10 mg) by mouth every evening 90 tablet 2     methylphenidate (METADATE ER) 20 MG ER tablet Take 3 tablets (60 mg) by mouth every morning (Patient taking differently: Take 60 mg by mouth every morning ) 90 tablet 0     HYDROXYZINE HCL PO Take 50 mg by mouth       fluticasone (FLONASE) 50 MCG/ACT nasal spray Spray 2 sprays into both nostrils as needed for rhinitis or allergies 3 Package 3     olopatadine (PATANOL) 0.1 % ophthalmic solution Place 1 drop into both eyes 2 times daily 5 mL 11     diphenhydrAMINE (BENADRYL) 25 MG tablet Take 1 tablet (25 mg) by mouth every 6 hours as needed  (allergic reaction) 30 tablet 2     EPINEPHrine (EPIPEN) 0.3 MG/0.3ML injection Inject 0.3 mLs (0.3 mg) into the muscle once as needed for anaphylaxis 2 each 2     albuterol (PROAIR HFA, PROVENTIL HFA, VENTOLIN HFA) 108 (90 BASE) MCG/ACT inhaler Inhale 2 puffs into the lungs every 4 hours as needed for shortness of breath / dyspnea or wheezing (cough or chest tightness) 1 Inhaler 2     QUEtiapine (SEROQUEL) 25 MG tablet Take 1 tablet (25 mg) by mouth nightly as needed 30 tablet 1     ORDER FOR DME Equipment being ordered: post op shoe         I have reviewed the Medications, Allergies, Past Medical and Surgical History, and Social History in the Epic system.    Review of Systems   Respiratory: Positive for shortness of breath.    Cardiovascular: Positive for palpitations.   All other systems reviewed and are negative.      Physical Exam   BP: 124/86  Heart Rate: 100  Temp: 98  F (36.7  C)  Resp: 14  Weight: 79.4 kg (175 lb)  SpO2: 99 %  Physical Exam   Constitutional: He is oriented to person, place, and time. He appears well-developed and well-nourished. No distress.   HENT:   Head: Normocephalic and atraumatic.   Eyes: Conjunctivae are normal. Pupils are equal, round, and reactive to light.   Cardiovascular: Normal rate, regular rhythm, normal heart sounds and normal pulses.    Pulmonary/Chest: Effort normal and breath sounds normal. No respiratory distress.   Abdominal: Soft. There is no tenderness.   Neurological: He is alert and oriented to person, place, and time.   Skin: Skin is warm and dry. No rash noted. He is not diaphoretic. There is pallor.   Psychiatric: His behavior is normal. His mood appears anxious.   Nursing note and vitals reviewed.      ED Course     ED Course     Procedures        EK17  1300 Normal sinus rhythm with a rate of 99 bpm.  Normal HI interval.  Normal QRS morphology.  Normal ST segments.  Impression: Normal EKG.       Results for orders placed or performed during the  hospital encounter of 03/06/17 (from the past 24 hour(s))   CBC with platelets differential   Result Value Ref Range    WBC 8.5 4.0 - 11.0 10e9/L    RBC Count 5.70 4.4 - 5.9 10e12/L    Hemoglobin 16.7 13.3 - 17.7 g/dL    Hematocrit 47.3 40.0 - 53.0 %    MCV 83 78 - 100 fl    MCH 29.3 26.5 - 33.0 pg    MCHC 35.3 31.5 - 36.5 g/dL    RDW 13.5 10.0 - 15.0 %    Platelet Count 343 150 - 450 10e9/L    Diff Method Automated Method     % Neutrophils 64.9 %    % Lymphocytes 24.2 %    % Monocytes 7.7 %    % Eosinophils 2.6 %    % Basophils 0.4 %    % Immature Granulocytes 0.2 %    Absolute Neutrophil 5.5 1.6 - 8.3 10e9/L    Absolute Lymphocytes 2.1 0.8 - 5.3 10e9/L    Absolute Monocytes 0.7 0.0 - 1.3 10e9/L    Absolute Eosinophils 0.2 0.0 - 0.7 10e9/L    Absolute Basophils 0.0 0.0 - 0.2 10e9/L    Abs Immature Granulocytes 0.0 0 - 0.4 10e9/L   D dimer quantitative   Result Value Ref Range    D Dimer 0.3 0.0 - 0.50 ug/ml FEU   INR   Result Value Ref Range    INR 0.97 0.86 - 1.14   Partial thromboplastin time   Result Value Ref Range    PTT 25 22 - 37 sec   Comprehensive metabolic panel   Result Value Ref Range    Sodium 141 133 - 144 mmol/L    Potassium 3.8 3.4 - 5.3 mmol/L    Chloride 105 94 - 109 mmol/L    Carbon Dioxide 27 20 - 32 mmol/L    Anion Gap 9 3 - 14 mmol/L    Glucose 106 (H) 70 - 99 mg/dL    Urea Nitrogen 12 7 - 30 mg/dL    Creatinine 0.97 0.66 - 1.25 mg/dL    GFR Estimate >90  Non  GFR Calc   >60 mL/min/1.7m2    GFR Estimate If Black >90   GFR Calc   >60 mL/min/1.7m2    Calcium 8.8 8.5 - 10.1 mg/dL    Bilirubin Total 0.3 0.2 - 1.3 mg/dL    Albumin 3.9 3.4 - 5.0 g/dL    Protein Total 8.1 6.8 - 8.8 g/dL    Alkaline Phosphatase 91 40 - 150 U/L    ALT 34 0 - 70 U/L    AST 19 0 - 45 U/L   Troponin I   Result Value Ref Range    Troponin I ES  0.000 - 0.045 ug/L     <0.015  The 99th percentile for upper reference range is 0.045 ug/L.  Troponin values in   the range of 0.045 - 0.120 ug/L may  be associated with risks of adverse   clinical events.     TSH with free T4 reflex   Result Value Ref Range    TSH 2.25 0.40 - 4.00 mU/L   CRP inflammation   Result Value Ref Range    CRP Inflammation 4.1 0.0 - 8.0 mg/L   Erythrocyte sedimentation rate auto   Result Value Ref Range    Sed Rate 8 0 - 15 mm/h     Medications   lidocaine 1 % 1 mL (not administered)   lidocaine (LMX4) kit (not administered)   sodium chloride (PF) 0.9% PF flush 3 mL (not administered)   sodium chloride (PF) 0.9% PF flush 3 mL (3 mLs Intracatheter Given 3/6/17 1311)         Assessments & Plan (with Medical Decision Making)  Logan Pittman is a 22 year old male presenting with shortness of breath and palpitations for the past few months. These are similar symptoms to when he had pericarditis. His vitals were stable on arrival with a pulse at 100. On exam, he appears to be anxious. His heart is normal rate and rhythm with no murmur. Exam was otherwise unremarkable.  An EKG is normal.  Labs are unremarkable including a CBC, comp rinse of metabolic panel, TSH, C-reactive protein, sed rate, d-dimer, PT and PTT, and troponin I.  This most likely represents an atypical chest pain possibly secondary to anxiety.  I reassured him that there is no evidence for an ensuing pericarditis or myopericarditis.  Patient to take ibuprofen for his pain symptoms.  All questions from the patient and staff were answered and the patient was suitable for discharge in stable condition.       I have reviewed the nursing notes.    I have reviewed the findings, diagnosis, plan and need for follow up with the patient.    Discharge Medication List as of 3/6/2017  2:17 PM          Final diagnoses:   Atypical chest pain   This document serves as a record of services personally performed by Timothy Kaplan MD. It was created on their behalf by Karey Kelly, a trained medical scribe. The creation of this record is based on the provider's personal observations  and the statements of the patient. This document has been checked and approved by the attending provider.   Note: Chart documentation done in part with Dragon Voice Recognition software. Although reviewed after completion, some word and grammatical errors may remain.        3/6/2017   [facility] EMERGENCY DEPARTMENT     Timothy Kaplan MD  03/06/17 1515

## 2017-03-06 NOTE — ED NOTES
Presents with heart palpitations. States he has a history of pericarditis and it feels like it did when he was diagnosed with that. He has been having them off and on for a few months.

## 2017-03-06 NOTE — ED AVS SNAPSHOT
[facility] Emergency Department    [address] [place] [address]    Phone:  [phone]    Fax:  [phone]                                       Logan Pittman   MRN: [id number]    Department:  [facility] Emergency Department   Date of Visit:  3/6/2017           After Visit Summary Signature Page     I have received my discharge instructions, and my questions have been answered. I have discussed any challenges I see with this plan with the nurse or doctor.    ..........................................................................................................................................  Patient/Patient Representative Signature      ..........................................................................................................................................  Patient Representative Print Name and Relationship to Patient    ..................................................               ................................................  Date                                            Time    ..........................................................................................................................................  Reviewed by Signature/Title    ...................................................              ..............................................  Date                                                            Time

## 2017-03-14 NOTE — PROGRESS NOTES
SUBJECTIVE:     CC: Logan Pittman is an 22 year old male who presents for preventative health visit.     Physical   Annual:     Getting at least 3 servings of Calcium per day::  Yes    Bi-annual eye exam::  Yes    Dental care twice a year::  Yes    Sleep apnea or symptoms of sleep apnea::  Daytime drowsiness    Diet::  Regular (no restrictions)    Frequency of exercise::  None    Taking medications regularly::  Yes    Medication side effects::  None    Additional concerns today::  No            -------------------------------------    Today's PHQ-2 Score:   PHQ-2 ( 1999 Pfizer) 11/17/2014   Q1: Little interest or pleasure in doing things 1   Q2: Feeling down, depressed or hopeless 1   PHQ-2 Score 2       Abuse: Current or Past(Physical, Sexual or Emotional)- No  Do you feel safe in your environment - Yes    Social History   Substance Use Topics     Smoking status: Current Every Day Smoker     Packs/day: 0.25     Smokeless tobacco: Never Used      Comment: rolls his own     Alcohol use No     The patient does not drink >3 drinks per day nor >7 drinks per week.    Last PSA: No results found for: PSA    Recent Labs   Lab Test  02/23/15   1850  04/16/12   0922   CHOL  171  240*   HDL  40*   --    LDL  103   --    TRIG  138   --    CHOLHDLRATIO  4.2   --        Reviewed orders with patient. Reviewed health maintenance and updated orders accordingly - Yes    Reviewed and updated as needed this visit by clinical staff         Reviewed and updated as needed this visit by Provider          Past Medical History   Diagnosis Date     ADHD      Asperger's syndrome      Food allergies      Multiple, skin and blood testing positive, Dr. Ponce follows     Kidney stone Age 8     Hospitalized for 1 week     Mild intermittent asthma      Triggers = environmental (chlorine), seasonal allergies     Pericarditis 2/15      Past Surgical History   Procedure Laterality Date     Appendectomy  2003     Hc orchiopexy,ingunial approach  Age  8     with bilateral hernia repair and hydrocele repair       ROS:  C: NEGATIVE for fever, chills, change in weight  I: NEGATIVE for worrisome rashes, moles or lesions  E: NEGATIVE for vision changes or irritation  ENT: NEGATIVE for ear, mouth and throat problems  R: NEGATIVE for significant cough or SOB  CV: NEGATIVE for chest pain, palpitations or peripheral edema  GI: NEGATIVE for nausea, abdominal pain, heartburn, or change in bowel habits   male: negative for dysuria, hematuria, decreased urinary stream, erectile dysfunction, urethral discharge  M: NEGATIVE for significant arthralgias or myalgia  N: NEGATIVE for weakness, dizziness or paresthesias  P: NEGATIVE for changes in mood or affect    Problem list, Medication list, Allergies, and Medical/Social/Surgical histories reviewed in [facility] and updated as appropriate.  Labs reviewed in EPIC  BP Readings from Last 3 Encounters:   03/20/17 147/88   03/06/17 124/86   01/26/17 100/60    Wt Readings from Last 3 Encounters:   03/20/17 174 lb (78.9 kg)   03/06/17 175 lb (79.4 kg)   01/26/17 175 lb (79.4 kg)                  Patient Active Problem List   Diagnosis     Intermittent asthma, uncomplicated     Food allergies     ADHD (attention deficit hyperactivity disorder)     Asperger's syndrome     Allergic rhinitis     Seasonal allergic rhinitis, unspecified allergic rhinitis trigger     Tobacco abuse     OCD (obsessive compulsive disorder)     H/O pericarditis     Secondary cardiomyopathy (H)     Peanut allergy     Oral allergy syndrome, initial encounter     Gastroesophageal reflux disease without esophagitis     Muscle spasm     Past Surgical History   Procedure Laterality Date     Appendectomy  2003      orchiopexy,ingunial approach  Age 8     with bilateral hernia repair and hydrocele repair       Social History   Substance Use Topics     Smoking status: Current Every Day Smoker     Packs/day: 0.25     Smokeless tobacco: Never Used      Comment: rolls his own      Alcohol use No     History reviewed. No pertinent family history.      Current Outpatient Prescriptions   Medication Sig Dispense Refill     BuPROPion HCl (WELLBUTRIN PO) Take 450 mg by mouth daily       QUEtiapine Fumarate (SEROQUEL PO) Take 50 mg by mouth At Bedtime Reported on 3/20/2017       Sertraline HCl (ZOLOFT PO) Take 150 mg by mouth daily       HYDROXYZINE HCL PO Take 50 mg by mouth       omeprazole (PRILOSEC) 20 MG CR capsule Take 1 capsule (20 mg) by mouth daily 30 capsule 11     cetirizine (ZYRTEC) 10 MG tablet Take 1 tablet (10 mg) by mouth every evening 90 tablet 2     fluticasone (FLONASE) 50 MCG/ACT nasal spray Spray 2 sprays into both nostrils as needed for rhinitis or allergies 3 Package 3     olopatadine (PATANOL) 0.1 % ophthalmic solution Place 1 drop into both eyes 2 times daily 5 mL 11     diphenhydrAMINE (BENADRYL) 25 MG tablet Take 1 tablet (25 mg) by mouth every 6 hours as needed (allergic reaction) 30 tablet 2     albuterol (PROAIR HFA, PROVENTIL HFA, VENTOLIN HFA) 108 (90 BASE) MCG/ACT inhaler Inhale 2 puffs into the lungs every 4 hours as needed for shortness of breath / dyspnea or wheezing (cough or chest tightness) 1 Inhaler 2     methylphenidate (METADATE ER) 20 MG ER tablet Take 3 tablets (60 mg) by mouth every morning (Patient taking differently: Take 60 mg by mouth every morning ) 90 tablet 0     EPINEPHrine (EPIPEN) 0.3 MG/0.3ML injection Inject 0.3 mLs (0.3 mg) into the muscle once as needed for anaphylaxis (Patient not taking: Reported on 3/20/2017) 2 each 2     [DISCONTINUED] QUEtiapine (SEROQUEL) 25 MG tablet Take 25 mg by mouth nightly as needed Reported on 3/20/2017 30 tablet 1     ORDER FOR DME Equipment being ordered: post op shoe (Patient not taking: Reported on 3/20/2017)       Allergies   Allergen Reactions     Cats      Dogs      Food      Potatoes, most raw vegetables     Peanuts [Nuts]      Seasonal Allergies      OBJECTIVE:     There were no vitals taken for  "this visit.   /88  Pulse 110  Temp 99  F (37.2  C) (Oral)  Resp 16  Ht 5' 4.65\" (1.642 m)  Wt 174 lb (78.9 kg)  SpO2 96%  BMI 29.27 kg/m2    EXAM:  Physical Exam   Constitutional: He is oriented to person, place, and time. He appears well-developed and well-nourished.   HENT:   Head: Normocephalic and atraumatic.   Right Ear: External ear normal.   Left Ear: External ear normal.   Mouth/Throat: Oropharynx is clear and moist.   Eyes: EOM are normal.   Neck: Neck supple.   Cardiovascular: Normal rate and regular rhythm.    Pulmonary/Chest: Effort normal and breath sounds normal.   Abdominal: Soft. Bowel sounds are normal.   Musculoskeletal: Normal range of motion.   Neurological: He is alert and oriented to person, place, and time.   Psychiatric: He has a normal mood and affect.       ASSESSMENT/PLAN:       Problem List Items Addressed This Visit     Allergic rhinitis     Well controlled zyrtec and flonase           Tobacco abuse     Trying to cut back on smoking         Secondary cardiomyopathy (H)     Followed by [facility]  Last visit in Jan 2017  Last echo 2015- stable  No acute worsening         Relevant Orders    Comprehensive metabolic panel (BMP + Alb, Alk Phos, ALT, AST, Total. Bili, TP)    Lipid Profile with reflex to direct LDL    Gastroesophageal reflux disease without esophagitis     Stable on atacid           Other Visit Diagnoses     Need for prophylactic vaccination with tetanus-diphtheria (TD)    -  Primary    Need for vaccination        Relevant Orders    TD PRSERV FREE >=7 YRS ADS IM [51060] (Completed)            COUNSELING:   Reviewed preventive health counseling, as reflected in patient instructions       Regular exercise       Healthy diet/nutrition       Vision screening       Hearing screening         reports that he has been smoking.  He has been smoking about 0.25 packs per day. He has never used smokeless tobacco.    Estimated body mass index is 29.44 kg/(m^2) as calculated from the " "following:    Height as of 1/24/17: 5' 4.65\" (1.642 m).    Weight as of 3/6/17: 175 lb (79.4 kg).   Weight management plan: Discussed healthy diet and exercise guidelines and patient will follow up in 12 months in clinic to re-evaluate.2    Counseling Resources:  ATP IV Guidelines  Pooled Cohorts Equation Calculator  FRAX Risk Assessment  ICSI Preventive Guidelines  Dietary Guidelines for Americans, 2010  USDA's MyPlate  ASA Prophylaxis  Lung CA Screening    Susie Cordova MD  [facility] for HPI/ROS submitted by the patient on 3/20/2017   Q1: Little interest or pleasure in doing things: 1=Several days  Q2: Feeling down, depressed or hopeless: 1=Several days  PHQ-2 Score: 2    "

## 2017-03-20 ENCOUNTER — OFFICE VISIT (OUTPATIENT)
Dept: FAMILY MEDICINE | Facility: OTHER | Age: 22
End: 2017-03-20
Payer: MEDICAID

## 2017-03-20 VITALS
TEMPERATURE: 99 F | SYSTOLIC BLOOD PRESSURE: 147 MMHG | DIASTOLIC BLOOD PRESSURE: 88 MMHG | BODY MASS INDEX: 28.99 KG/M2 | WEIGHT: 174 LBS | RESPIRATION RATE: 16 BRPM | OXYGEN SATURATION: 96 % | HEIGHT: 65 IN | HEART RATE: 110 BPM

## 2017-03-20 DIAGNOSIS — J30.9 ALLERGIC RHINITIS, UNSPECIFIED ALLERGIC RHINITIS TRIGGER, UNSPECIFIED RHINITIS SEASONALITY: ICD-10-CM

## 2017-03-20 DIAGNOSIS — Z23 NEED FOR VACCINATION: ICD-10-CM

## 2017-03-20 DIAGNOSIS — Z23 NEED FOR PROPHYLACTIC VACCINATION WITH TETANUS-DIPHTHERIA (TD): Primary | ICD-10-CM

## 2017-03-20 DIAGNOSIS — Z72.0 TOBACCO ABUSE: ICD-10-CM

## 2017-03-20 DIAGNOSIS — K21.9 GASTROESOPHAGEAL REFLUX DISEASE WITHOUT ESOPHAGITIS: ICD-10-CM

## 2017-03-20 DIAGNOSIS — I42.9 SECONDARY CARDIOMYOPATHY (H): ICD-10-CM

## 2017-03-20 PROCEDURE — 90714 TD VACC NO PRESV 7 YRS+ IM: CPT | Performed by: FAMILY MEDICINE

## 2017-03-20 PROCEDURE — 90686 IIV4 VACC NO PRSV 0.5 ML IM: CPT | Performed by: FAMILY MEDICINE

## 2017-03-20 PROCEDURE — 90471 IMMUNIZATION ADMIN: CPT | Performed by: FAMILY MEDICINE

## 2017-03-20 PROCEDURE — 90472 IMMUNIZATION ADMIN EACH ADD: CPT | Performed by: FAMILY MEDICINE

## 2017-03-20 PROCEDURE — 99395 PREV VISIT EST AGE 18-39: CPT | Mod: 25 | Performed by: FAMILY MEDICINE

## 2017-03-20 ASSESSMENT — PAIN SCALES - GENERAL: PAINLEVEL: NO PAIN (0)

## 2017-03-20 NOTE — NURSING NOTE
"Chief Complaint   Patient presents with     Physical     Panel Management       Initial BP (!) 122/98 (BP Location: Left arm, Patient Position: Chair, Cuff Size: Adult Regular)  Pulse 110  Temp 99  F (37.2  C) (Oral)  Resp 16  Ht 5' 4.65\" (1.642 m)  Wt 174 lb (78.9 kg)  SpO2 96%  BMI 29.27 kg/m2 Estimated body mass index is 29.27 kg/(m^2) as calculated from the following:    Height as of this encounter: 5' 4.65\" (1.642 m).    Weight as of this encounter: 174 lb (78.9 kg).  Medication Reconciliation: complete  "

## 2017-03-20 NOTE — MR AVS SNAPSHOT
After Visit Summary   3/20/2017    Logan Pittman    MRN: [id number]           Patient Information     Date Of Birth          1995        Visit Information        Provider Department      3/20/2017 3:40 PM Susie Cordova MD [facility]        Today's Diagnoses     Need for prophylactic vaccination with tetanus-diphtheria (TD)    -  1    Allergic rhinitis, unspecified allergic rhinitis trigger, unspecified rhinitis seasonality        Gastroesophageal reflux disease without esophagitis        Secondary cardiomyopathy (H)        Tobacco abuse        Need for vaccination          Care Instructions      Preventive Health Recommendations  Male Ages 18 - 25     Yearly exam:             See your health care provider every year in order to  o   Review health changes.   o   Discuss preventive care.    o   Review your medicines if your doctor has prescribed any.    You should be tested each year for STDs (sexually transmitted diseases).     Talk to your provider about cholesterol testing.      If you are at risk for diabetes, you should have a diabetes test (fasting glucose).    Shots: Get a flu shot each year. Get a tetanus shot every 10 years.     Nutrition:    Eat at least 5 servings of fruits and vegetables daily.     Eat whole-grain bread, whole-wheat pasta and brown rice instead of white grains and rice.     Talk to your provider about calcium and Vitamin D.     Lifestyle    Exercise for at least 150 minutes a week (30 minutes a day, 5 days a week). This will help you control your weight and prevent disease.     Limit alcohol to one drink per day.     No smoking.     Wear sunscreen to prevent skin cancer.     See your dentist every six months for an exam and cleaning.           Follow-ups after your visit        Your next 10 appointments already scheduled     May 10, 2017  1:00 PM CDT   [facility] Visit with Ozzy [facility]valle [facility]   [facility]     "[address] [place] [id number] [phone]              Future tests that were ordered for you today     Open Future Orders        Priority Expected Expires Ordered    Comprehensive metabolic panel (BMP + Alb, Alk Phos, ALT, AST, Total. Bili, TP) Routine  3/20/2018 3/20/2017    Lipid Profile with reflex to direct LDL Routine  3/20/2018 3/20/2017            Who to contact     If you have questions or need follow up information about today's clinic visit or your schedule please contact [facility] directly at [phone].  Normal or non-critical lab and imaging results will be communicated to you by MyChart, letter or phone within 4 business days after the clinic has received the results. If you do not hear from us within 7 days, please contact the clinic through [facility] or phone. If you have a critical or abnormal lab result, we will notify you by phone as soon as possible.  Submit refill requests through [facility] or call your pharmacy and they will forward the refill request to us. Please allow 3 business days for your refill to be completed.          Additional Information About Your Visit        MyChart Information     [facility]onder lets you send messages to your doctor, view your test results, renew your prescriptions, schedule appointments and more. To sign up, go to [email or web address] . Click on \"Log in\" on the left side of the screen, which will take you to the Welcome page. Then click on \"Sign up Now\" on the right side of the page.     You will be asked to enter the access code listed below, as well as some personal information. Please follow the directions to create your username and password.     Your access code is: [email or web address]  Expires: 3/21/2017  3:47 PM     Your access code will  in 90 days. If you need help or a new code, please call your [facility]3] or [phone].        Care EveryWhere ID     This is your Care EveryWhere ID. This could be used by other " "organizations to access your [place] medical records  [id number]        Your Vitals Were     Pulse Temperature Respirations Height Pulse Oximetry BMI (Body Mass Index)    110 99  F (37.2  C) (Oral) 16 5' 4.65\" (1.642 m) 96% 29.27 kg/m2       Blood Pressure from Last 3 Encounters:   03/20/17 147/88   03/06/17 124/86   01/26/17 100/60    Weight from Last 3 Encounters:   03/20/17 174 lb (78.9 kg)   03/06/17 175 lb (79.4 kg)   01/26/17 175 lb (79.4 kg)              We Performed the Following     TD PRSERV FREE >=7 YRS ADS IM [91273]        Primary Care Provider Office Phone # Fax #    Susie Cordova [phone]       [facility] [address] [facility] [address]    [facility] [address]        Thank you!     Thank you for choosing [facility]  for your care. Our goal is always to provide you with excellent care. Hearing back from our patients is one way we can continue to improve our services. Please take a few minutes to complete the written survey that you may receive in the mail after your visit with us. Thank you!             Your Updated Medication List - Protect others around you: Learn how to safely use, store and throw away your medicines at www.disposemymeds.org.          This list is accurate as of: 3/20/17  4:31 PM.  Always use your most recent med list.                   Brand Name Dispense Instructions for use    albuterol 108 (90 BASE) MCG/ACT Inhaler    PROAIR HFA/PROVENTIL HFA/VENTOLIN HFA    1 Inhaler    Inhale 2 puffs into the lungs every 4 hours as needed for shortness of breath / dyspnea or wheezing (cough or chest tightness)       cetirizine 10 MG tablet    zyrTEC    90 tablet    Take 1 tablet (10 mg) by mouth every evening       diphenhydrAMINE 25 MG tablet    BENADRYL    30 tablet    Take 1 tablet (25 mg) by mouth every 6 hours as needed (allergic reaction)       EPINEPHrine 0.3 MG/0.3ML injection     2 each    Inject 0.3 mLs (0.3 mg) into the muscle once as " needed for anaphylaxis       fluticasone 50 MCG/ACT spray    FLONASE    3 Package    Spray 2 sprays into both nostrils as needed for rhinitis or allergies       HYDROXYZINE HCL PO      Take 50 mg by mouth       methylphenidate 20 MG CR tablet    METADATE ER    90 tablet    Take 3 tablets (60 mg) by mouth every morning       olopatadine 0.1 % ophthalmic solution    PATANOL    5 mL    Place 1 drop into both eyes 2 times daily       omeprazole 20 MG CR capsule    priLOSEC    30 capsule    Take 1 capsule (20 mg) by mouth daily       order for DME      Equipment being ordered: post op shoe       SEROQUEL PO      Take 50 mg by mouth At Bedtime Reported on 3/20/2017       WELLBUTRIN PO      Take 450 mg by mouth daily       ZOLOFT PO      Take 150 mg by mouth daily

## 2017-03-20 NOTE — LETTER
My Asthma Action Plan  Name: Logan Pittman   YOB: 1995  Date: 3/20/2017   My doctor: Susie Cordova MD   My clinic: [facility]        My Control Medicine: .  My Rescue Medicine: Albuterol (Proair/Ventolin/Proventil) inhaler .   My Asthma Severity: mild persistent  Avoid your asthma triggers: pollens               GREEN ZONE     Good Control    I feel good    No cough or wheeze    Can work, sleep and play without asthma symptoms       Take your asthma control medicine every day.     1. If exercise triggers your asthma, take your rescue medication    15 minutes before exercise or sports, and    During exercise if you have asthma symptoms  2. Spacer to use with inhaler: If you have a spacer, make sure to use it with your inhaler             YELLOW ZONE     Getting Worse  I have ANY of these:    I do not feel good    Cough or wheeze    Chest feels tight    Wake up at night   1. Keep taking your Green Zone medications  2. Start taking your rescue medicine:    every 20 minutes for up to 1 hour. Then every 4 hours for 24-48 hours.  3. If you stay in the Yellow Zone for more than 12-24 hours, contact your doctor.  4. If you do not return to the Green Zone in 12-24 hours or you get worse, start taking your oral steroid medicine if prescribed by your provider.           RED ZONE     Medical Alert - Get Help  I have ANY of these:    I feel awful    Medicine is not helping    Breathing getting harder    Trouble walking or talking    Nose opens wide to breathe       1. Take your rescue medicine NOW  2. If your provider has prescribed an oral steroid medicine, start taking it NOW  3. Call your doctor NOW  4. If you are still in the Red Zone after 20 minutes and you have not reached your doctor:    Take your rescue medicine again and    Call 911 or go to the emergency room right away    See your regular doctor within 2 weeks of an Emergency Room or Urgent Care visit for follow-up treatment.         Electronically signed by: Susie Cordova, March 20, 2017    Annual Reminders:  Meet with Asthma Educator,  Flu Shot in the Fall, consider Pneumonia Vaccination for patients with asthma (aged 19 and older).    Pharmacy:    [facility] [place] - [address] [facility] [facility] [place] [address] [facility] [address]  [address]  [place] [facility] [place] [address] [facility] [place] [address] [place] [address] [place] - [address] [facility] [address] [facility] [address]                    Asthma Triggers  How To Control Things That Make Your Asthma Worse    Triggers are things that make your asthma worse.  Look at the list below to help you find your triggers and what you can do about them.  You can help prevent asthma flare-ups by staying away from your triggers.      Trigger                                                          What you can do   Cigarette Smoke  Tobacco smoke can make asthma worse. Do not allow smoking in your home, car or around you.  Be sure no one smokes at a child s day care or school.  If you smoke, ask your health care provider for ways to help you quit.  Ask family members to quit too.  Ask your health care provider for a referral to Quit Plan to help you quit smoking, or call 7-960-448-PLAN.     Colds, Flu, Bronchitis  These are common triggers of asthma. Wash your hands often.  Don t touch your eyes, nose or mouth.  Get a flu shot every year.     Dust Mites  These are tiny bugs that live in cloth or carpet. They are too small to see. Wash sheets and blankets in hot water every week.   Encase pillows and mattress in dust mite proof covers.  Avoid having carpet if you can. If you have carpet, vacuum weekly.   Use a dust mask and HEPA vacuum.   Pollen and Outdoor Mold  Some people are allergic to trees, grass, or weed pollen, or molds. Try to keep your windows closed.  Limit time out  doors when pollen count is high.   Ask you health care provider about taking medicine during allergy season.     Animal Dander  Some people are allergic to skin flakes, urine or saliva from pets with fur or feathers. Keep pets with fur or feathers out of your home.    If you can t keep the pet outdoors, then keep the pet out of your bedroom.  Keep the bedroom door closed.  Keep pets off cloth furniture and away from stuffed toys.     Mice, Rats, and Cockroaches  Some people are allergic to the waste from these pests.   Cover food and garbage.  Clean up spills and food crumbs.  Store grease in the refrigerator.   Keep food out of the bedroom.   Indoor Mold  This can be a trigger if your home has high moisture. Fix leaking faucets, pipes, or other sources of water.   Clean moldy surfaces.  Dehumidify basement if it is damp and smelly.   Smoke, Strong Odors, and Sprays  These can reduce air quality. Stay away from strong odors and sprays, such as perfume, powder, hair spray, paints, smoke incense, paint, cleaning products, candles and new carpet.   Exercise or Sports  Some people with asthma have this trigger. Be active!  Ask your doctor about taking medicine before sports or exercise to prevent symptoms.    Warm up for 5-10 minutes before and after sports or exercise.     Other Triggers of Asthma  Cold air:  Cover your nose and mouth with a scarf.  Sometimes laughing or crying can be a trigger.  Some medicines and food can trigger asthma.

## 2017-03-20 NOTE — NURSING NOTE
Screening Questionnaire for Adult Immunization    Are you sick today?   No   Do you have allergies to medications, food, a vaccine component or latex?   Yes   Have you ever had a serious reaction after receiving a vaccination?   No   Do you have a long-term health problem with heart disease, lung disease, asthma, kidney disease, metabolic disease (e.g. diabetes), anemia, or other blood disorder?   No   Do you have cancer, leukemia, HIV/AIDS, or any other immune system problem?   No   In the past 3 months, have you taken medications that affect  your immune system, such as prednisone, other steroids, or anticancer drugs; drugs for the treatment of rheumatoid arthritis, Crohn s disease, or psoriasis; or have you had radiation treatments?   No   Have you had a seizure, or a brain or other nervous system problem?   No   During the past year, have you received a transfusion of blood or blood     products, or been given immune (gamma) globulin or antiviral drug?   No   For women: Are you pregnant or is there a chance you could become        pregnant during the next month?   No   Have you received any vaccinations in the past 4 weeks?   No     Immunization questionnaire was positive for at least one answer.  Notified Dr Cordova.

## 2017-03-21 ASSESSMENT — ASTHMA QUESTIONNAIRES: ACT_TOTALSCORE: 24

## 2017-04-27 RX ORDER — DIPHENHYDRAMINE HCL 25 MG
25 TABLET ORAL EVERY 6 HOURS PRN
Qty: 30 TABLET | Refills: 2 | Status: CANCELLED | OUTPATIENT
Start: 2017-04-27

## 2017-04-27 NOTE — TELEPHONE ENCOUNTER
diphenhydrAMINE (BENADRYL) 25 MG tablet      Last Written Prescription Date: 03/16/2015  Last Fill Quantity: 30,  # refills: 2   Last Office Visit with [facility], [facility] or [facility] prescribing provider: 03/20/2017                                         Next 5 appointments (look out 90 days)     May 10, 2017  1:00 PM CDT   Return Visit with Ozzy Delvalle MD   [facility] ([facility])    [address] [place] [id number] [phone]

## 2017-05-01 NOTE — TELEPHONE ENCOUNTER
Routing refill request to provider for review/approval because:  A break in medication    Hector Palmer RN

## 2017-05-05 DIAGNOSIS — J30.9 ALLERGIC RHINITIS: ICD-10-CM

## 2017-05-05 NOTE — TELEPHONE ENCOUNTER
zyrtec      Last Written Prescription Date: 07/189/16  Last Fill Quantity: 90,  # refills: 2   Last Office Visit with [facility], [facility] or [facility] prescribing provider: 03/20/17                                         Next 5 appointments (look out 90 days)     May 10, 2017  1:00 PM CDT   Return Visit with Ozzy Delvalle MD   [facility]    [address] [place] [id number] [phone]

## 2017-05-08 RX ORDER — CETIRIZINE HYDROCHLORIDE 10 MG/1
10 TABLET ORAL EVERY EVENING
Qty: 90 TABLET | Refills: 2 | Status: SHIPPED | OUTPATIENT
Start: 2017-05-08

## 2017-05-08 NOTE — TELEPHONE ENCOUNTER
Prescription approved per [facility] Refill Protocol.  Angella Harris, RN, BSN

## 2017-05-10 ENCOUNTER — OFFICE VISIT (OUTPATIENT)
Dept: CARDIOLOGY | Facility: CLINIC | Age: 22
End: 2017-05-10
Payer: MEDICAID

## 2017-05-10 VITALS
BODY MASS INDEX: 30.61 KG/M2 | RESPIRATION RATE: 12 BRPM | DIASTOLIC BLOOD PRESSURE: 68 MMHG | SYSTOLIC BLOOD PRESSURE: 130 MMHG | WEIGHT: 179.3 LBS | OXYGEN SATURATION: 96 % | HEIGHT: 64 IN | HEART RATE: 106 BPM

## 2017-05-10 DIAGNOSIS — I30.0 IDIOPATHIC PERICARDITIS, UNSPECIFIED CHRONICITY: Primary | ICD-10-CM

## 2017-05-10 PROCEDURE — 99214 OFFICE O/P EST MOD 30 MIN: CPT | Performed by: INTERNAL MEDICINE

## 2017-05-10 ASSESSMENT — PAIN SCALES - GENERAL: PAINLEVEL: NO PAIN (0)

## 2017-05-10 NOTE — LETTER
5/10/2017      RE: Logan Pittman  [address] [place] [address]       Dear Colleague,    Thank you for the opportunity to participate in the care of your patient, Logan Pittman, at the [facility] at [facility]. Please see a copy of my visit note below.    HPI and Plan:   See dictation      No orders of the defined types were placed in this encounter.    No orders of the defined types were placed in this encounter.    There are no discontinued medications.      No diagnosis found.    CURRENT MEDICATIONS:  Current Outpatient Prescriptions   Medication Sig Dispense Refill     cetirizine (ZYRTEC) 10 MG tablet Take 1 tablet (10 mg) by mouth every evening 90 tablet 2     BuPROPion HCl (WELLBUTRIN PO) Take 450 mg by mouth daily       QUEtiapine Fumarate (SEROQUEL PO) Take 50 mg by mouth At Bedtime Reported on 3/20/2017       Sertraline HCl (ZOLOFT PO) Take 150 mg by mouth daily       HYDROXYZINE HCL PO Take 50 mg by mouth       omeprazole (PRILOSEC) 20 MG CR capsule Take 1 capsule (20 mg) by mouth daily 30 capsule 11     fluticasone (FLONASE) 50 MCG/ACT nasal spray Spray 2 sprays into both nostrils as needed for rhinitis or allergies 3 Package 3     olopatadine (PATANOL) 0.1 % ophthalmic solution Place 1 drop into both eyes 2 times daily 5 mL 11     diphenhydrAMINE (BENADRYL) 25 MG tablet Take 1 tablet (25 mg) by mouth every 6 hours as needed (allergic reaction) 30 tablet 2     EPINEPHrine (EPIPEN) 0.3 MG/0.3ML injection Inject 0.3 mLs (0.3 mg) into the muscle once as needed for anaphylaxis 2 each 2     albuterol (PROAIR HFA, PROVENTIL HFA, VENTOLIN HFA) 108 (90 BASE) MCG/ACT inhaler Inhale 2 puffs into the lungs every 4 hours as needed for shortness of breath / dyspnea or wheezing (cough or chest tightness) 1 Inhaler 2     methylphenidate (METADATE ER) 20 MG ER tablet Take 3 tablets (60 mg) by mouth every morning (Patient taking differently: Take 60 mg by  mouth every morning ) 90 tablet 0     ORDER FOR DME Equipment being ordered: post op shoe         ALLERGIES     Allergies   Allergen Reactions     Cats      Dogs      Food      Potatoes, most raw vegetables     Peanuts [Nuts]      Seasonal Allergies        PAST MEDICAL HISTORY:  Past Medical History:   Diagnosis Date     ADHD      Asperger's syndrome      Food allergies     Multiple, skin and blood testing positive, Dr. Ponce follows     Kidney stone Age 8    Hospitalized for 1 week     Mild intermittent asthma     Triggers = environmental (chlorine), seasonal allergies     Pericarditis 2/15       PAST SURGICAL HISTORY:  Past Surgical History:   Procedure Laterality Date     APPENDECTOMY  2003     HC ORCHIOPEXY,INGUNIAL APPROACH  Age 8    with bilateral hernia repair and hydrocele repair       FAMILY HISTORY:  History reviewed. No pertinent family history.    SOCIAL HISTORY:  Social History     Social History     Marital status: Single     Spouse name: N/A     Number of children: N/A     Years of education: N/A     Social History Main Topics     Smoking status: Current Every Day Smoker     Packs/day: 0.25     Smokeless tobacco: Never Used      Comment: rolls his own     Alcohol use No     Drug use: No     Sexual activity: Yes     Partners: Female     Birth control/ protection: Condom     Other Topics Concern     None     Social History Narrative         Review of Systems:  Skin:  Negative       Eyes:  Negative visual blurring    ENT:  Negative      Respiratory:  Negative cough dry cough occ.   Cardiovascular:  Negative for;palpitations;edema;lightheadedness;dizziness;chest pain Positive for;palpitations infrequent  Gastroenterology: Positive for heartburn on meds helps most of the time  Genitourinary:  Negative      Musculoskeletal:  Positive for back pain trying to get into the chiropractor  Neurologic:  Negative      Psychiatric:  Positive for sleep disturbances medications are helping   Heme/Lymph/Imm:   "Positive for allergies    Endocrine:  Negative        Physical Exam:  Vitals: /68 (Cuff Size: Adult Regular)  Pulse 106  Resp 12  Ht 1.626 m (5' 4\")  Wt 81.3 kg (179 lb 4.8 oz)  SpO2 96%  BMI 30.78 kg/m2    Constitutional:  cooperative, alert and oriented, well developed, well nourished, in no acute distress        Skin:  warm and dry to the touch        Head:  normocephalic        Eyes:  pupils equal and round;sclera white        ENT:  no pallor or cyanosis        Neck:  carotid pulses are full and equal bilaterally;JVP normal;no carotid bruit        Chest:  clear to auscultation          Cardiac: regular rhythm;normal S1 and S2;no murmurs, gallops or rubs detected;no S3 or S4 tachycardic                Abdomen:  abdomen soft;no masses;non-tender        Vascular: pulses full and equal                                        Extremities and Back:  no deformities, clubbing, cyanosis, erythema observed              Neurological:  affect appropriate, oriented to time, person and place;no gross motor deficits          Recent Lab Results:  LIPID RESULTS:  Lab Results   Component Value Date    CHOL 171 02/23/2015    HDL 40 (L) 02/23/2015     02/23/2015    TRIG 138 02/23/2015    CHOLHDLRATIO 4.2 02/23/2015       LIVER ENZYME RESULTS:  Lab Results   Component Value Date    AST 19 03/06/2017    ALT 34 03/06/2017       CBC RESULTS:  Lab Results   Component Value Date    WBC 8.5 03/06/2017    RBC 5.70 03/06/2017    HGB 16.7 03/06/2017    HCT 47.3 03/06/2017    MCV 83 03/06/2017    MCH 29.3 03/06/2017    MCHC 35.3 03/06/2017    RDW 13.5 03/06/2017     03/06/2017       BMP RESULTS:  Lab Results   Component Value Date     03/06/2017    POTASSIUM 3.8 03/06/2017    CHLORIDE 105 03/06/2017    CO2 27 03/06/2017    ANIONGAP 9 03/06/2017     (H) 03/06/2017    BUN 12 03/06/2017    CR 0.97 03/06/2017    GFRESTIMATED >90  Non  GFR Calc   03/06/2017    GFRESTBLACK >90   " GFR Calc   03/06/2017    RUSS 8.8 03/06/2017        A1C RESULTS:  No results found for: A1C    INR RESULTS:  Lab Results   Component Value Date    INR 0.97 03/06/2017           Please do not hesitate to contact me if you have any questions/concerns.     Sincerely,     CARMELA DELVALLE MD      CC  Carmela Delvalle MD   [facility]  [address]  [place] [id number]

## 2017-05-10 NOTE — LETTER
5/10/2017    Susie Cordova MD  [address] [address]    RE: Logan Pittman       Dear Colleague,    I had the pleasure of seeing Logan Pittman in the [facility].    Logan came today, a bit to my surprise.  He had perimyocarditis clinically 2 years ago - 2015.  Since that time, he has remained asymptomatic from a pericarditis standpoint without chest pain, palpitations, dizziness or syncope.      His resting heart rate is a bit fast at 104 beats a minute.  He is on a rather extensive program of medications affecting mood and attitude, most importantly methylphenidate 60 mg a day, which has an amphetamine-like quality.  He also takes Flonase, Prilosec, hydroxyzine, Zoloft, Seroquel, Wellbutrin and Zyrtec.  These are listed in Epic.      PHYSICAL EXAMINATION:   GENERAL:  Shows a well-developed, well-nourished, trim adult male.   VITAL SIGNS:  Blood pressure was 130/70, heart rate 104 beats per minute and regular.  He weighed 179 pounds.   HEAD:  Normal.   NECK:  Free of neck vein distention or bruits.   HEART:  Rapid and regular without gallop or murmur.   LUNGS:  Clear.   ABDOMEN:  Soft without organomegaly, mass or bruit.   EXTREMITIES:  Showed no edema.     Outpatient Encounter Prescriptions as of 5/10/2017   Medication Sig Dispense Refill     cetirizine (ZYRTEC) 10 MG tablet Take 1 tablet (10 mg) by mouth every evening 90 tablet 2     BuPROPion HCl (WELLBUTRIN PO) Take 450 mg by mouth daily       QUEtiapine Fumarate (SEROQUEL PO) Take 50 mg by mouth At Bedtime Reported on 3/20/2017       Sertraline HCl (ZOLOFT PO) Take 150 mg by mouth daily       HYDROXYZINE HCL PO Take 50 mg by mouth       omeprazole (PRILOSEC) 20 MG CR capsule Take 1 capsule (20 mg) by mouth daily 30 capsule 11     fluticasone (FLONASE) 50 MCG/ACT nasal spray Spray 2 sprays into both nostrils as needed for rhinitis or allergies 3 Package 3     olopatadine  (PATANOL) 0.1 % ophthalmic solution Place 1 drop into both eyes 2 times daily 5 mL 11     diphenhydrAMINE (BENADRYL) 25 MG tablet Take 1 tablet (25 mg) by mouth every 6 hours as needed (allergic reaction) 30 tablet 2     EPINEPHrine (EPIPEN) 0.3 MG/0.3ML injection Inject 0.3 mLs (0.3 mg) into the muscle once as needed for anaphylaxis 2 each 2     albuterol (PROAIR HFA, PROVENTIL HFA, VENTOLIN HFA) 108 (90 BASE) MCG/ACT inhaler Inhale 2 puffs into the lungs every 4 hours as needed for shortness of breath / dyspnea or wheezing (cough or chest tightness) 1 Inhaler 2     methylphenidate (METADATE ER) 20 MG ER tablet Take 3 tablets (60 mg) by mouth every morning (Patient taking differently: Take 60 mg by mouth every morning ) 90 tablet 0     ORDER FOR DME Equipment being ordered: post op shoe       No facility-administered encounter medications on file as of 5/10/2017.       IMPRESSION AND PLAN:  Logan is 22.  I believe he has recovered from his perimyocarditis.  Clinically, he is asymptomatic.  His heart rate is a bit fast, I believe related to the amphetamine-like effect of Metadate.  His last echo from 05/2015 showed a normal heart.      One might get an echo in a year just to be on the exceptionally safe side.  The sinus tachycardia, I believe, is drug-related and likely related to cardiac disease, but he is asymptomatic otherwise and it fits my impression that his cardiac status remains within normal limits.     Again, thank you for allowing me to participate in the care of your patient.      Sincerely,    CARMELA OLIVO MD     [facility]

## 2017-05-10 NOTE — MR AVS SNAPSHOT
"              After Visit Summary   5/10/2017    Logan Pittman    MRN: [id number]           Patient Information     Date Of Birth          1995        Visit Information        Provider Department      5/10/2017 1:00 PM Ozzy Delvalle MD [facility]         Follow-ups after your visit        Who to contact     If you have questions or need follow up information about today's clinic visit or your schedule please contact [facility] directly at [phone].  Normal or non-critical lab and imaging results will be communicated to you by MyChart, letter or phone within 4 business days after the clinic has received the results. If you do not hear from us within 7 days, please contact the clinic through MyChart or phone. If you have a critical or abnormal lab result, we will notify you by phone as soon as possible.  Submit refill requests through [employer] or call your pharmacy and they will forward the refill request to us. Please allow 3 business days for your refill to be completed.          Additional Information About Your Visit        [facility] Information     [employer] lets you send messages to your doctor, view your test results, renew your prescriptions, schedule appointments and more. To sign up, go to [email or web address] . Click on \"Log in\" on the left side of the screen, which will take you to the Welcome page. Then click on \"Sign up Now\" on the right side of the page.     You will be asked to enter the access code listed below, as well as some personal information. Please follow the directions to create your username and password.     Your access code is: [email or web address]  Expires: 2017  1:20 PM     Your access code will  in 90 days. If you need help or a new code, please call your [facility] or [phone].        Care EveryWhere ID     This is your Care EveryWhere ID. This could be used by other organizations to access your [facility] " "records  [id number]        Your Vitals Were     Pulse Respirations Height Pulse Oximetry BMI (Body Mass Index)       106 12 1.626 m (5' 4\") 96% 30.78 kg/m2        Blood Pressure from Last 3 Encounters:   05/10/17 130/68   03/20/17 147/88   03/06/17 124/86    Weight from Last 3 Encounters:   05/10/17 81.3 kg (179 lb 4.8 oz)   03/20/17 78.9 kg (174 lb)   03/06/17 79.4 kg (175 lb)              Today, you had the following     No orders found for display       Primary Care Provider Office Phone # Fax #    Susie Cordova [phone]       [facility] [address] [address]        Thank you!     Thank you for choosing [facility]  for your care. Our goal is always to provide you with excellent care. Hearing back from our patients is one way we can continue to improve our services. Please take a few minutes to complete the written survey that you may receive in the mail after your visit with us. Thank you!             Your Updated Medication List - Protect others around you: Learn how to safely use, store and throw away your medicines at www.disposemymeds.org.          This list is accurate as of: 5/10/17  1:20 PM.  Always use your most recent med list.                   Brand Name Dispense Instructions for use    albuterol 108 (90 BASE) MCG/ACT Inhaler    PROAIR HFA/PROVENTIL HFA/VENTOLIN HFA    1 Inhaler    Inhale 2 puffs into the lungs every 4 hours as needed for shortness of breath / dyspnea or wheezing (cough or chest tightness)       cetirizine 10 MG tablet    zyrTEC    90 tablet    Take 1 tablet (10 mg) by mouth every evening       diphenhydrAMINE 25 MG tablet    BENADRYL    30 tablet    Take 1 tablet (25 mg) by mouth every 6 hours as needed (allergic reaction)       EPINEPHrine 0.3 MG/0.3ML injection     2 each    Inject 0.3 mLs (0.3 mg) into the muscle once as needed for anaphylaxis       fluticasone 50 MCG/ACT spray    FLONASE    3 Package "    Spray 2 sprays into both nostrils as needed for rhinitis or allergies       HYDROXYZINE HCL PO      Take 50 mg by mouth       methylphenidate 20 MG CR tablet    METADATE ER    90 tablet    Take 3 tablets (60 mg) by mouth every morning       olopatadine 0.1 % ophthalmic solution    PATANOL    5 mL    Place 1 drop into both eyes 2 times daily       omeprazole 20 MG CR capsule    priLOSEC    30 capsule    Take 1 capsule (20 mg) by mouth daily       order for DME      Equipment being ordered: post op shoe       SEROQUEL PO      Take 50 mg by mouth At Bedtime Reported on 3/20/2017       WELLBUTRIN PO      Take 450 mg by mouth daily       ZOLOFT PO      Take 150 mg by mouth daily

## 2017-05-10 NOTE — PROGRESS NOTES
HPI and Plan:   See dictation      No orders of the defined types were placed in this encounter.    No orders of the defined types were placed in this encounter.    There are no discontinued medications.      No diagnosis found.    CURRENT MEDICATIONS:  Current Outpatient Prescriptions   Medication Sig Dispense Refill     cetirizine (ZYRTEC) 10 MG tablet Take 1 tablet (10 mg) by mouth every evening 90 tablet 2     BuPROPion HCl (WELLBUTRIN PO) Take 450 mg by mouth daily       QUEtiapine Fumarate (SEROQUEL PO) Take 50 mg by mouth At Bedtime Reported on 3/20/2017       Sertraline HCl (ZOLOFT PO) Take 150 mg by mouth daily       HYDROXYZINE HCL PO Take 50 mg by mouth       omeprazole (PRILOSEC) 20 MG CR capsule Take 1 capsule (20 mg) by mouth daily 30 capsule 11     fluticasone (FLONASE) 50 MCG/ACT nasal spray Spray 2 sprays into both nostrils as needed for rhinitis or allergies 3 Package 3     olopatadine (PATANOL) 0.1 % ophthalmic solution Place 1 drop into both eyes 2 times daily 5 mL 11     diphenhydrAMINE (BENADRYL) 25 MG tablet Take 1 tablet (25 mg) by mouth every 6 hours as needed (allergic reaction) 30 tablet 2     EPINEPHrine (EPIPEN) 0.3 MG/0.3ML injection Inject 0.3 mLs (0.3 mg) into the muscle once as needed for anaphylaxis 2 each 2     albuterol (PROAIR HFA, PROVENTIL HFA, VENTOLIN HFA) 108 (90 BASE) MCG/ACT inhaler Inhale 2 puffs into the lungs every 4 hours as needed for shortness of breath / dyspnea or wheezing (cough or chest tightness) 1 Inhaler 2     methylphenidate (METADATE ER) 20 MG ER tablet Take 3 tablets (60 mg) by mouth every morning (Patient taking differently: Take 60 mg by mouth every morning ) 90 tablet 0     ORDER FOR DME Equipment being ordered: post op shoe         ALLERGIES     Allergies   Allergen Reactions     Cats      Dogs      Food      Potatoes, most raw vegetables     Peanuts [Nuts]      Seasonal Allergies        PAST MEDICAL HISTORY:  Past Medical History:   Diagnosis Date      "ADHD      Asperger's syndrome      Food allergies     Multiple, skin and blood testing positive, Dr. Ponce follows     Kidney stone Age 8    Hospitalized for 1 week     Mild intermittent asthma     Triggers = environmental (chlorine), seasonal allergies     Pericarditis 2/15       PAST SURGICAL HISTORY:  Past Surgical History:   Procedure Laterality Date     APPENDECTOMY  2003     HC ORCHIOPEXY,INGUNIAL APPROACH  Age 8    with bilateral hernia repair and hydrocele repair       FAMILY HISTORY:  History reviewed. No pertinent family history.    SOCIAL HISTORY:  Social History     Social History     Marital status: Single     Spouse name: N/A     Number of children: N/A     Years of education: N/A     Social History Main Topics     Smoking status: Current Every Day Smoker     Packs/day: 0.25     Smokeless tobacco: Never Used      Comment: rolls his own     Alcohol use No     Drug use: No     Sexual activity: Yes     Partners: Female     Birth control/ protection: Condom     Other Topics Concern     None     Social History Narrative         Review of Systems:  Skin:  Negative       Eyes:  Negative visual blurring    ENT:  Negative      Respiratory:  Negative cough dry cough occ.   Cardiovascular:  Negative for;palpitations;edema;lightheadedness;dizziness;chest pain Positive for;palpitations infrequent  Gastroenterology: Positive for heartburn on meds helps most of the time  Genitourinary:  Negative      Musculoskeletal:  Positive for back pain trying to get into the chiropractor  Neurologic:  Negative      Psychiatric:  Positive for sleep disturbances medications are helping   Heme/Lymph/Imm:  Positive for allergies    Endocrine:  Negative        Physical Exam:  Vitals: /68 (Cuff Size: Adult Regular)  Pulse 106  Resp 12  Ht 1.626 m (5' 4\")  Wt 81.3 kg (179 lb 4.8 oz)  SpO2 96%  BMI 30.78 kg/m2    Constitutional:  cooperative, alert and oriented, well developed, well nourished, in no acute distress    "     Skin:  warm and dry to the touch        Head:  normocephalic        Eyes:  pupils equal and round;sclera white        ENT:  no pallor or cyanosis        Neck:  carotid pulses are full and equal bilaterally;JVP normal;no carotid bruit        Chest:  clear to auscultation          Cardiac: regular rhythm;normal S1 and S2;no murmurs, gallops or rubs detected;no S3 or S4 tachycardic                Abdomen:  abdomen soft;no masses;non-tender        Vascular: pulses full and equal                                        Extremities and Back:  no deformities, clubbing, cyanosis, erythema observed              Neurological:  affect appropriate, oriented to time, person and place;no gross motor deficits          Recent Lab Results:  LIPID RESULTS:  Lab Results   Component Value Date    CHOL 171 02/23/2015    HDL 40 (L) 02/23/2015     02/23/2015    TRIG 138 02/23/2015    CHOLHDLRATIO 4.2 02/23/2015       LIVER ENZYME RESULTS:  Lab Results   Component Value Date    AST 19 03/06/2017    ALT 34 03/06/2017       CBC RESULTS:  Lab Results   Component Value Date    WBC 8.5 03/06/2017    RBC 5.70 03/06/2017    HGB 16.7 03/06/2017    HCT 47.3 03/06/2017    MCV 83 03/06/2017    MCH 29.3 03/06/2017    MCHC 35.3 03/06/2017    RDW 13.5 03/06/2017     03/06/2017       BMP RESULTS:  Lab Results   Component Value Date     03/06/2017    POTASSIUM 3.8 03/06/2017    CHLORIDE 105 03/06/2017    CO2 27 03/06/2017    ANIONGAP 9 03/06/2017     (H) 03/06/2017    BUN 12 03/06/2017    CR 0.97 03/06/2017    GFRESTIMATED >90  Non  GFR Calc   03/06/2017    GFRESTBLACK >90   GFR Calc   03/06/2017    RUSS 8.8 03/06/2017        A1C RESULTS:  No results found for: A1C    INR RESULTS:  Lab Results   Component Value Date    INR 0.97 03/06/2017           CC  Ozzy Delvalle MD   [facility]  [address]  [place] [address]

## 2017-05-11 NOTE — PROGRESS NOTES
HISTORY OF PRESENT ILLNESS:  Clau came today, a bit to my surprise.  He had perimyocarditis clinically 2 years ago - .  Since that time, he has remained asymptomatic from a pericarditis standpoint without chest pain, palpitations, dizziness or syncope.      His resting heart rate is a bit fast at 104 beats a minute.  He is on a rather extensive program of medications affecting mood and attitude, most importantly methylphenidate 60 mg a day, which has an amphetamine-like quality.  He also takes Flonase, Prilosec, hydroxyzine, Zoloft, Seroquel, Wellbutrin and Zyrtec.  These are listed in Epic.      PHYSICAL EXAMINATION:   GENERAL:  Shows a well-developed, well-nourished, trim adult male.   VITAL SIGNS:  Blood pressure was 130/70, heart rate 104 beats per minute and regular.  He weighed 179 pounds.   HEAD:  Normal.   NECK:  Free of neck vein distention or bruits.   HEART:  Rapid and regular without gallop or murmur.   LUNGS:  Clear.   ABDOMEN:  Soft without organomegaly, mass or bruit.   EXTREMITIES:  Showed no edema.      IMPRESSION AND PLAN:  Clau is 22.  I believe he has recovered from his perimyocarditis.  Clinically, he is asymptomatic.  His heart rate is a bit fast, I believe related to the amphetamine-like effect of Metadate.  His last echo from 2015 showed a normal heart.      One might get an echo in a year just to be on the exceptionally safe side.  The sinus tachycardia, I believe, is drug-related and likely related to cardiac disease, but he is asymptomatic otherwise and it fits my impression that his cardiac status remains within normal limits.      cc:   Susie Cordova MD    [address], [address]    [place]  [address]         CARMELA OLIVO MD, [facility]             D: 05/10/2017 13:24   T: 2017 12:07   MT: BREANNA      Name:     CLAU CHRISTIANSON   MRN:      [id number]        Account:      [id number]   :      1995           Service Date:  05/10/2017      Document: [id number]

## 2017-07-27 ENCOUNTER — TELEPHONE (OUTPATIENT)
Dept: FAMILY MEDICINE | Facility: OTHER | Age: 22
End: 2017-07-27

## 2017-07-27 NOTE — TELEPHONE ENCOUNTER
Meggan  Madhavi from Logan's group [facility] called [phone].  She rec'd a call from clinic but there is no message of us calling.  Did you need to reach her for anything?  If not, you can close encounter.  I told her we would call back if needing anything.

## 2017-08-02 NOTE — PROGRESS NOTES
"  SUBJECTIVE:                                                    Logan Pittman is a 22 year old male who presents to clinic today for the following health issues:      HPI    ABDOMINAL   PAIN     Onset: 2-3 months    Description:   Character: \"expanding in the intestines\"  Location: right sided, lower  Radiation: None    Intensity: mild    Progression of Symptoms:  same and intermittent    Accompanying Signs & Symptoms:  Fever/Chills?: no   Gas/Bloating: YES- gas  Nausea: no   Vomitting: YES  Diarrhea?: YES  Constipation:no   Dysuria or Hematuria: no    History:   Trauma: no   Previous similar pain: no    Previous tests done: none    Precipitating factors:   Does the pain change with:     Food: no      BM: YES- gets better afterwords    Urination: no     Alleviating factors:  NA    Therapies Tried and outcome: anti-gas/bloating, TUMS, pepto bismol    LMP:  not applicable         Problem list and histories reviewed & adjusted, as indicated.  Additional history: as documented        Patient Active Problem List   Diagnosis     Intermittent asthma, uncomplicated     Food allergies     ADHD (attention deficit hyperactivity disorder)     Asperger's syndrome     Allergic rhinitis     Seasonal allergic rhinitis, unspecified allergic rhinitis trigger     Tobacco abuse     OCD (obsessive compulsive disorder)     H/O pericarditis     Secondary cardiomyopathy (H)     Peanut allergy     Oral allergy syndrome, initial encounter     Gastroesophageal reflux disease without esophagitis     Muscle spasm     Abdominal pain, generalized     Past Surgical History:   Procedure Laterality Date     APPENDECTOMY  2003     HC ORCHIOPEXY,INGUNIAL APPROACH  Age 8    with bilateral hernia repair and hydrocele repair       Social History   Substance Use Topics     Smoking status: Current Every Day Smoker     Packs/day: 0.25     Smokeless tobacco: Never Used      Comment: rolls his own     Alcohol use No     History reviewed. No pertinent family " history.      Current Outpatient Prescriptions   Medication Sig Dispense Refill     cetirizine (ZYRTEC) 10 MG tablet Take 1 tablet (10 mg) by mouth every evening 90 tablet 2     BuPROPion HCl (WELLBUTRIN PO) Take 450 mg by mouth daily       QUEtiapine Fumarate (SEROQUEL PO) Take 50 mg by mouth At Bedtime Reported on 3/20/2017       Sertraline HCl (ZOLOFT PO) Take 150 mg by mouth daily       HYDROXYZINE HCL PO Take 50 mg by mouth       omeprazole (PRILOSEC) 20 MG CR capsule Take 1 capsule (20 mg) by mouth daily 30 capsule 11     fluticasone (FLONASE) 50 MCG/ACT nasal spray Spray 2 sprays into both nostrils as needed for rhinitis or allergies 3 Package 3     olopatadine (PATANOL) 0.1 % ophthalmic solution Place 1 drop into both eyes 2 times daily 5 mL 11     diphenhydrAMINE (BENADRYL) 25 MG tablet Take 1 tablet (25 mg) by mouth every 6 hours as needed (allergic reaction) 30 tablet 2     EPINEPHrine (EPIPEN) 0.3 MG/0.3ML injection Inject 0.3 mLs (0.3 mg) into the muscle once as needed for anaphylaxis 2 each 2     albuterol (PROAIR HFA, PROVENTIL HFA, VENTOLIN HFA) 108 (90 BASE) MCG/ACT inhaler Inhale 2 puffs into the lungs every 4 hours as needed for shortness of breath / dyspnea or wheezing (cough or chest tightness) 1 Inhaler 2     methylphenidate (METADATE ER) 20 MG ER tablet Take 3 tablets (60 mg) by mouth every morning (Patient taking differently: Take 60 mg by mouth every morning ) 90 tablet 0     ORDER FOR DME Equipment being ordered: post op shoe       Allergies   Allergen Reactions     Cats      Dogs      Food      Potatoes, most raw vegetables     Peanuts [Nuts]      Seasonal Allergies      BP Readings from Last 3 Encounters:   08/03/17 126/82   05/10/17 130/68   03/20/17 147/88    Wt Readings from Last 3 Encounters:   08/03/17 173 lb (78.5 kg)   05/10/17 179 lb 4.8 oz (81.3 kg)   03/20/17 174 lb (78.9 kg)                  Labs reviewed in EPIC        ROS:  Constitutional, HEENT, cardiovascular, pulmonary,  GI, , musculoskeletal, neuro, skin, endocrine and psych systems are negative, except as otherwise noted.      OBJECTIVE:   /82 (BP Location: Right arm, Patient Position: Chair, Cuff Size: Adult Regular)  Pulse 101  Temp 98.3  F (36.8  C) (Temporal)  Resp 16  Wt 173 lb (78.5 kg)  SpO2 98%  BMI 29.7 kg/m2  Body mass index is 29.7 kg/(m^2).   Physical Exam   Constitutional: He is oriented to person, place, and time. He appears well-developed and well-nourished.   HENT:   Head: Normocephalic and atraumatic.   Eyes: EOM are normal.   Neck: Neck supple.   Cardiovascular: Normal rate and regular rhythm.    Pulmonary/Chest: Effort normal and breath sounds normal.   Abdominal: Soft. Bowel sounds are normal. He exhibits no distension and no mass. There is tenderness. There is no rebound and no guarding.   Neurological: He is alert and oriented to person, place, and time.   Psychiatric: He has a normal mood and affect.   \    Diagnostic Test Results:  none     ASSESSMENT/PLAN:     Problem List Items Addressed This Visit     Abdominal pain, generalized     Pt has been having generalized abdominal pain with intermittent diarrhea worse in the past 2-3 months   Reports he has had similar symptoms for alomost all his life but worse in the last few months   Exam is benign  Will get labs and imaging to r/o infectious vs autoimmune vs intolerances vs partial SBO  Advised to log food intake to r/o intolerances  Follow results and plan accordingly  If inconclusive will consider Ct vs colonoscopy   Pt agreeable to the plan. He is here accompanied by a care giver             Other Visit Diagnoses     Need for prophylactic vaccination against Streptococcus pneumoniae (pneumococcus)    -  Primary    Diarrhea, unspecified type        Relevant Orders    Lipid panel reflex to direct LDL    Clostridium difficile Toxin B PCR    Enteric Bacteria and Virus Panel by JOSE Stool    Cryptosporidium Antigen by EIA Stool    Fecal colorectal  cancer screen (FIT)    Ova and Parasite Exam Routine    XR KUB    CBC with platelets    Comprehensive metabolic panel (BMP + Alb, Alk Phos, ALT, AST, Total. Bili, TP)             Susie Cordova MD  [facility]

## 2017-08-03 ENCOUNTER — OFFICE VISIT (OUTPATIENT)
Dept: FAMILY MEDICINE | Facility: OTHER | Age: 22
End: 2017-08-03
Payer: MEDICAID

## 2017-08-03 VITALS
WEIGHT: 173 LBS | BODY MASS INDEX: 29.7 KG/M2 | RESPIRATION RATE: 16 BRPM | OXYGEN SATURATION: 98 % | HEART RATE: 101 BPM | DIASTOLIC BLOOD PRESSURE: 82 MMHG | SYSTOLIC BLOOD PRESSURE: 126 MMHG | TEMPERATURE: 98.3 F

## 2017-08-03 DIAGNOSIS — R10.84 ABDOMINAL PAIN, GENERALIZED: ICD-10-CM

## 2017-08-03 DIAGNOSIS — R19.7 DIARRHEA, UNSPECIFIED TYPE: ICD-10-CM

## 2017-08-03 DIAGNOSIS — Z23 NEED FOR PROPHYLACTIC VACCINATION AGAINST STREPTOCOCCUS PNEUMONIAE (PNEUMOCOCCUS): Primary | ICD-10-CM

## 2017-08-03 LAB
ERYTHROCYTE [DISTWIDTH] IN BLOOD BY AUTOMATED COUNT: 13.8 % (ref 10–15)
HCT VFR BLD AUTO: 49.9 % (ref 40–53)
HGB BLD-MCNC: 16.8 G/DL (ref 13.3–17.7)
MCH RBC QN AUTO: 29.1 PG (ref 26.5–33)
MCHC RBC AUTO-ENTMCNC: 33.7 G/DL (ref 31.5–36.5)
MCV RBC AUTO: 86 FL (ref 78–100)
PLATELET # BLD AUTO: 280 10E9/L (ref 150–450)
RBC # BLD AUTO: 5.78 10E12/L (ref 4.4–5.9)
WBC # BLD AUTO: 9.5 10E9/L (ref 4–11)

## 2017-08-03 PROCEDURE — 80053 COMPREHEN METABOLIC PANEL: CPT | Performed by: FAMILY MEDICINE

## 2017-08-03 PROCEDURE — 80061 LIPID PANEL: CPT | Performed by: FAMILY MEDICINE

## 2017-08-03 PROCEDURE — 85027 COMPLETE CBC AUTOMATED: CPT | Performed by: FAMILY MEDICINE

## 2017-08-03 PROCEDURE — 99214 OFFICE O/P EST MOD 30 MIN: CPT | Performed by: FAMILY MEDICINE

## 2017-08-03 PROCEDURE — 83721 ASSAY OF BLOOD LIPOPROTEIN: CPT | Performed by: FAMILY MEDICINE

## 2017-08-03 PROCEDURE — 86140 C-REACTIVE PROTEIN: CPT | Performed by: FAMILY MEDICINE

## 2017-08-03 PROCEDURE — 36415 COLL VENOUS BLD VENIPUNCTURE: CPT | Performed by: FAMILY MEDICINE

## 2017-08-03 ASSESSMENT — PAIN SCALES - GENERAL: PAINLEVEL: MILD PAIN (2)

## 2017-08-03 NOTE — NURSING NOTE
"Chief Complaint   Patient presents with     Abdominal Pain     Panel Management     mychart, pneumovax, honoring choices, lipids, aap       Initial /82 (BP Location: Right arm, Patient Position: Chair, Cuff Size: Adult Regular)  Pulse 101  Temp 98.3  F (36.8  C) (Temporal)  Resp 16  Wt 173 lb (78.5 kg)  SpO2 98%  BMI 29.7 kg/m2 Estimated body mass index is 29.7 kg/(m^2) as calculated from the following:    Height as of 5/10/17: 5' 4\" (1.626 m).    Weight as of this encounter: 173 lb (78.5 kg).  Medication Reconciliation: complete     Tisha Staton CMA      "

## 2017-08-03 NOTE — MR AVS SNAPSHOT
After Visit Summary   8/3/2017    Logan Pittman    MRN: [id number]           Patient Information     Date Of Birth          1995        Visit Information        Provider Department      8/3/2017 4:00 PM Susie Cordova MD [facility]        Today's Diagnoses     Need for prophylactic vaccination against Streptococcus pneumoniae (pneumococcus)    -  1    Diarrhea, unspecified type        Abdominal pain, generalized           Follow-ups after your visit        Future tests that were ordered for you today     Open Future Orders        Priority Expected Expires Ordered    XR KUB Routine 8/3/2017 8/3/2018 8/3/2017    Clostridium difficile Toxin B PCR Routine  9/2/2017 8/3/2017    Enteric Bacteria and Virus Panel by JOSE Stool Routine  8/3/2018 8/3/2017    Cryptosporidium Antigen by EIA Stool Routine  8/3/2018 8/3/2017    Fecal colorectal cancer screen (FIT) Routine 8/24/2017 10/26/2017 8/3/2017    Ova and Parasite Exam Routine Routine  8/3/2018 8/3/2017            Who to contact     If you have questions or need follow up information about today's clinic visit or your schedule please contact [facility] directly at [phone].  Normal or non-critical lab and imaging results will be communicated to you by MyChart, letter or phone within 4 business days after the clinic has received the results. If you do not hear from us within 7 days, please contact the clinic through MyChart or phone. If you have a critical or abnormal lab result, we will notify you by phone as soon as possible.  Submit refill requests through [employer] or call your pharmacy and they will forward the refill request to us. Please allow 3 business days for your refill to be completed.          Additional Information About Your Visit        MyChart Information     [employer] lets you send messages to your doctor, view your test results, renew your prescriptions, schedule appointments and more. To sign up, go  "to [email or web address] . Click on \"Log in\" on the left side of the screen, which will take you to the Welcome page. Then click on \"Sign up Now\" on the right side of the page.     You will be asked to enter the access code listed below, as well as some personal information. Please follow the directions to create your username and password.     Your access code is: [email or web address]  Expires: 2017  1:20 PM     Your access code will  in 90 days. If you need help or a new code, please call your [place] clinic or [phone].        Care EveryWhere ID     This is your Care EveryWhere ID. This could be used by other organizations to access your [facility] medical records  [id number]        Your Vitals Were     Pulse Temperature Respirations Pulse Oximetry BMI (Body Mass Index)       101 98.3  F (36.8  C) (Temporal) 16 98% 29.7 kg/m2        Blood Pressure from Last 3 Encounters:   17 126/82   05/10/17 130/68   17 147/88    Weight from Last 3 Encounters:   17 173 lb (78.5 kg)   05/10/17 179 lb 4.8 oz (81.3 kg)   17 174 lb (78.9 kg)              We Performed the Following     CBC with platelets     Comprehensive metabolic panel (BMP + Alb, Alk Phos, ALT, AST, Total. Bili, TP)     CRP, inflammation     Lipid panel reflex to direct LDL        Primary Care Provider Office Phone # Fax #    Susie Cordova [phone]       [facility] [address] [facility] [address]    [facility] [address]        Equal Access to Services     POONAM MARINA AH: Hadii mora Stevens, rafa kent, [facility]. So [facility] [phone].    ATENCIÓN: Si habla español, tiene a phelps disposición servicios gratuitos de asistencia lingüística. Llame al [phone].    We comply with applicable federal civil rights laws and [place] laws. We do not discriminate on the basis of race, color, national origin, age, disability sex, sexual " orientation or gender identity.            Thank you!     Thank you for choosing [facility]  for your care. Our goal is always to provide you with excellent care. Hearing back from our patients is one way we can continue to improve our services. Please take a few minutes to complete the written survey that you may receive in the mail after your visit with us. Thank you!             Your Updated Medication List - Protect others around you: Learn how to safely use, store and throw away your medicines at www.disposemymeds.org.          This list is accurate as of: 8/3/17  4:32 PM.  Always use your most recent med list.                   Brand Name Dispense Instructions for use Diagnosis    albuterol 108 (90 BASE) MCG/ACT Inhaler    PROAIR HFA/PROVENTIL HFA/VENTOLIN HFA    1 Inhaler    Inhale 2 puffs into the lungs every 4 hours as needed for shortness of breath / dyspnea or wheezing (cough or chest tightness)    Intermittent asthma       cetirizine 10 MG tablet    zyrTEC    90 tablet    Take 1 tablet (10 mg) by mouth every evening    Allergic rhinitis       diphenhydrAMINE 25 MG tablet    BENADRYL    30 tablet    Take 1 tablet (25 mg) by mouth every 6 hours as needed (allergic reaction)        EPINEPHrine 0.3 MG/0.3ML injection 2-pack    EPIPEN/ADRENACLICK/or ANY BX GENERIC EQUIV    2 each    Inject 0.3 mLs (0.3 mg) into the muscle once as needed for anaphylaxis        fluticasone 50 MCG/ACT spray    FLONASE    3 Package    Spray 2 sprays into both nostrils as needed for rhinitis or allergies        HYDROXYZINE HCL PO      Take 50 mg by mouth        methylphenidate 20 MG CR tablet    METADATE ER    90 tablet    Take 3 tablets (60 mg) by mouth every morning    ADHD (attention deficit hyperactivity disorder)       olopatadine 0.1 % ophthalmic solution    PATANOL    5 mL    Place 1 drop into both eyes 2 times daily    Chronic allergic conjunctivitis       omeprazole 20 MG CR capsule    priLOSEC    30  capsule    Take 1 capsule (20 mg) by mouth daily    Acid indigestion       order for DME      Equipment being ordered: post op shoe    Foot contusion       SEROQUEL PO      Take 50 mg by mouth At Bedtime Reported on 3/20/2017        WELLBUTRIN PO      Take 450 mg by mouth daily        ZOLOFT PO      Take 150 mg by mouth daily

## 2017-08-03 NOTE — ASSESSMENT & PLAN NOTE
Pt has been having generalized abdominal pain with intermittent diarrhea worse in the past 2-3 months   Reports he has had similar symptoms for alomost all his life but worse in the last few months   Exam is benign  Will get labs and imaging to r/o infectious vs autoimmune vs intolerances vs partial SBO  Advised to log food intake to r/o intolerances  Follow results and plan accordingly  If inconclusive will consider Ct vs colonoscopy   Pt agreeable to the plan. He is here accompanied by a care giver

## 2017-08-04 LAB
ALBUMIN SERPL-MCNC: 3.9 G/DL (ref 3.4–5)
ALP SERPL-CCNC: 94 U/L (ref 40–150)
ALT SERPL W P-5'-P-CCNC: 35 U/L (ref 0–70)
ANION GAP SERPL CALCULATED.3IONS-SCNC: 9 MMOL/L (ref 3–14)
AST SERPL W P-5'-P-CCNC: 14 U/L (ref 0–45)
BILIRUB SERPL-MCNC: 0.5 MG/DL (ref 0.2–1.3)
BUN SERPL-MCNC: 13 MG/DL (ref 7–30)
CALCIUM SERPL-MCNC: 9.3 MG/DL (ref 8.5–10.1)
CHLORIDE SERPL-SCNC: 103 MMOL/L (ref 94–109)
CHOLEST SERPL-MCNC: 273 MG/DL
CO2 SERPL-SCNC: 28 MMOL/L (ref 20–32)
CREAT SERPL-MCNC: 1 MG/DL (ref 0.66–1.25)
CRP SERPL-MCNC: <2.9 MG/L (ref 0–8)
GFR SERPL CREATININE-BSD FRML MDRD: ABNORMAL ML/MIN/1.7M2
GLUCOSE SERPL-MCNC: 147 MG/DL (ref 70–99)
HDLC SERPL-MCNC: 32 MG/DL
LDLC SERPL CALC-MCNC: ABNORMAL MG/DL
LDLC SERPL DIRECT ASSAY-MCNC: 186 MG/DL
NONHDLC SERPL-MCNC: 241 MG/DL
POTASSIUM SERPL-SCNC: 3.4 MMOL/L (ref 3.4–5.3)
PROT SERPL-MCNC: 7.8 G/DL (ref 6.8–8.8)
SODIUM SERPL-SCNC: 140 MMOL/L (ref 133–144)
TRIGL SERPL-MCNC: 415 MG/DL

## 2017-10-06 DIAGNOSIS — J45.20 INTERMITTENT ASTHMA: ICD-10-CM

## 2017-10-06 DIAGNOSIS — J45.909 MODERATE ASTHMA WITHOUT COMPLICATION, UNSPECIFIED WHETHER PERSISTENT: Primary | ICD-10-CM

## 2017-10-06 RX ORDER — ALBUTEROL SULFATE 90 UG/1
2 AEROSOL, METERED RESPIRATORY (INHALATION) EVERY 4 HOURS PRN
Qty: 1 INHALER | Refills: 2 | Status: SHIPPED | OUTPATIENT
Start: 2017-10-06

## 2017-10-06 NOTE — TELEPHONE ENCOUNTER
Kostas       Last Written Prescription Date: 03/16/2015  Last Fill Quantity: 1, # refills: 2    Last Office Visit with G, P or [facility] prescribing provider:  8/3/2017   Future Office Visit:       Date of Last Asthma Action Plan Letter:   Asthma Action Plan Q1 Year    Topic Date Due     Asthma Action Plan - yearly  03/20/2018      Asthma Control Test:   ACT Total Scores 3/20/2017   ACT TOTAL SCORE -   ASTHMA ER VISITS -   ASTHMA HOSPITALIZATIONS -   ACT TOTAL SCORE (Goal Greater than or Equal to 20) 24   In the past 12 months, how many times did you visit the emergency room for your asthma without being admitted to the hospital? 0   In the past 12 months, how many times were you hospitalized overnight because of your asthma? 0       Date of Last Spirometry Test:   No results found for this or any previous visit.          Miroslava Morrow MA

## 2017-10-16 ENCOUNTER — TELEPHONE (OUTPATIENT)
Dept: FAMILY MEDICINE | Facility: OTHER | Age: 22
End: 2017-10-16

## 2017-10-16 NOTE — TELEPHONE ENCOUNTER
Logan Pittman is a 22 year old male who calls with difficulty breathing.    NURSING ASSESSMENT:  Description:  Spoke with Jeremías from pt's group home who states that pt is complaining of difficulty breathing.  She didn't have many details.  Onset/duration:  Today.  Precip. factors:  none  Associated symptoms:  Difficulty breathing.  Caller denies that pt has visible signs of difficulty breathing.  Pt does have an inhaler but has been waiting for an rx for his inhaler for awhile.  Let caller know that an rx for Proair was sent to the pharmacy on 10/6/17.  Improves/worsens symptoms:  none    Allergies:   Allergies   Allergen Reactions     Cats      Dogs      Food      Potatoes, most raw vegetables     Peanuts [Nuts]      Seasonal Allergies      RECOMMENDED DISPOSITION:  To ED/UC for evaluation, another person to drive - due to difficulty breathing.  Will comply with recommendation: Yes  If further questions/concerns or if symptoms do not improve, worsen or new symptoms develop, call your PCP or [facility] as soon as possible.      Guideline used: Breathing problems  Telephone Triage Protocols for Nurses, Fifth Edition, Leti Madrigal RN

## 2017-10-16 NOTE — TELEPHONE ENCOUNTER
Reason for call:  Patient reporting a symptom    Symptom or request: breathing     Duration (how long have symptoms been present): 1 hour     Have you been treated for this before? No    Additional comments: patient at group home and nury calling in today. Transferred to urgent line     Phone Number patient can be reached at:  Cell number on file:    Telephone Information:           Best Time:  [phone]    Can we leave a detailed message on this number:  YES    Call taken on 10/16/2017 at 10:20 AM by Randa Guzmán

## 2017-11-06 DIAGNOSIS — J30.9 ALLERGIC RHINITIS: ICD-10-CM

## 2017-11-07 DIAGNOSIS — T78.40XA ALLERGIC STATE, INITIAL ENCOUNTER: Primary | ICD-10-CM

## 2017-11-07 DIAGNOSIS — H10.45 CHRONIC ALLERGIC CONJUNCTIVITIS: ICD-10-CM

## 2017-11-07 NOTE — TELEPHONE ENCOUNTER
Epipen 0.3 mg auto inject      Last Written Prescription Date: 03/16/2015  Last Fill Quantity: 2 each,  # refills: 2   Last Office Visit with [facility] [place] or [facility] prescribing provider: 08/03/2017    Patanol 0.1 % eye soln.       Last Written Prescription Date:03/16/2015  Last Fill Quantity: 5 ml,  # refills: 11   Last Office Visit with [facility] [place] or [facility] prescribing provider: 08/03/2017

## 2017-11-08 RX ORDER — CETIRIZINE HYDROCHLORIDE 10 MG/1
10 TABLET ORAL EVERY EVENING
Qty: 90 TABLET | Refills: 2 | OUTPATIENT
Start: 2017-11-08

## 2017-11-08 NOTE — TELEPHONE ENCOUNTER
Refill not appropriate.  Rx was sent to pharmacy on 5/8/17 with a 3 month supply and 2 additional refills.  Pt shouldn't need more refills until February, 2018.    Moriah Madrigal RN

## 2017-11-09 ENCOUNTER — TELEPHONE (OUTPATIENT)
Dept: FAMILY MEDICINE | Facility: OTHER | Age: 22
End: 2017-11-09

## 2017-11-09 RX ORDER — OLOPATADINE HYDROCHLORIDE 1 MG/ML
1 SOLUTION/ DROPS OPHTHALMIC 2 TIMES DAILY
Qty: 5 ML | Refills: 11 | Status: SHIPPED | OUTPATIENT
Start: 2017-11-09

## 2017-11-09 RX ORDER — EPINEPHRINE 0.3 MG/.3ML
0.3 INJECTION SUBCUTANEOUS
Qty: 0.3 ML | Refills: 1 | Status: SHIPPED | OUTPATIENT
Start: 2017-11-09

## 2017-11-09 NOTE — TELEPHONE ENCOUNTER
[facility] Prior Authorization Team   Phone: [phone]  Fax: [phone]      PA Initiation    Medication: EPINEPHrine (EPIPEN/ADRENACLICK/OR ANY BX GENERIC EQUIV) 0.3 MG/0.3ML injection 2-pack  Insurance Company: [place] Medicaid ([facility]) - Phone [phone] Fax [phone]  Pharmacy Filling the Rx: [facility] [place]  [address] [place] [address] [address]  Filling Pharmacy Phone: [phone]  Filling Pharmacy Fax: [phone]  Start Date: 11/9/2017

## 2017-11-09 NOTE — TELEPHONE ENCOUNTER
Prior Authorization Retail Medication Request  Medication/Dose: epi pen  Diagnosis and ICD code:   New/Renewal/Insurance Change PA:   Previously Tried and Failed Therapies:     Insurance ID (if provided): cover my meds Key [id number]  Insurance Phone (if provided):     Any additional info from fax request:     If you received a fax notification from an outside Pharmacy:  Pharmacy Name:  Pharmacy #:  Pharmacy Fax:

## 2017-11-10 NOTE — TELEPHONE ENCOUNTER
[facility] Prior Authorization Team   Phone: [phone]  Fax: [phone]      Prior Authorization Not Needed per Insurance    Medication: EPINEPHrine (EPIPEN/ADRENACLICK/OR ANY BX GENERIC EQUIV) 0.3 MG/0.3ML injection 2-pack- PA not needed generic covered   Insurance Company: [place] Medicaid ([facility]) - Phone [phone] Fax [phone]  Expected CoPay: n/a    Pharmacy Filling the Rx: [facility] [address] [facility] [address]  Pharmacy Notified: YesComment:  Spoke to tech and informed her of the Two NDCs that are covered per patient's MA ([phone] or [phone]) she stated they would have to redo the order/enter those NDCs in and order the medications. Was unable to give me a copay at this time.  Patient Notified:  Yes- left voicemail

## 2018-06-04 RX ORDER — HYDROXYZINE HYDROCHLORIDE 25 MG/1
TABLET, FILM COATED ORAL
Qty: 180 TABLET | Refills: 0 | OUTPATIENT
Start: 2018-06-04

## 2018-06-04 NOTE — TELEPHONE ENCOUNTER
"Requested Prescriptions   Pending Prescriptions Disp Refills     hydrOXYzine (ATARAX) 25 MG tablet [Pharmacy Med Name: HYDROXYZINE HCL 25 MG TABLET] 180 tablet 0     Sig: TAKE 2 TABLETS BY MOUTH NIGHTLY AT BEDTIME    Antihistamines Protocol Passed    5/31/2018  7:53 AM       Passed - Recent (12 mo) or future (30 days) visit within the authorizing provider's specialty    Patient had office visit in the last 12 months or has a visit in the next 30 days with authorizing provider or within the authorizing provider's specialty.  See \"Patient Info\" tab in inbasket, or \"Choose Columns\" in Meds & Orders section of the refill encounter.           Passed - Patient is age 3 or older    Apply age and/or weight-based dosing for peds patients age 3 and older.    Forward request to provider for patients under the age of 3.          Last ov 08/03/2017  Last filled: historical    Routing refill request to provider for review/approval because:  Medication is reported/historical    Hector Palmer, RN, BSN                  "

## 2018-06-11 NOTE — TELEPHONE ENCOUNTER
"Requested Prescriptions   Pending Prescriptions Disp Refills     hydrOXYzine (ATARAX) 25 MG tablet [Pharmacy Med Name: HYDROXYZINE HCL 25 MG TABLET] 180 tablet 0     Sig: TAKE 2 TABLETS BY MOUTH NIGHTLY AT BEDTIME    Antihistamines Protocol Passed    6/8/2018  9:00 PM       Passed - Recent (12 mo) or future (30 days) visit within the authorizing provider's specialty    Patient had office visit in the last 12 months or has a visit in the next 30 days with authorizing provider or within the authorizing provider's specialty.  See \"Patient Info\" tab in inbasket, or \"Choose Columns\" in Meds & Orders section of the refill encounter.           Passed - Patient is age 3 or older    Apply age and/or weight-based dosing for peds patients age 3 and older.    Forward request to provider for patients under the age of 3.          HYDROXYZINE HCL PO  Routing refill request to provider for review/approval because:  Medication is reported/historical  Sadia Choe RN, BSN           "

## 2018-06-11 NOTE — TELEPHONE ENCOUNTER
Please ask patient:  - Has DR. Cordova ever prescribed him this? Cannot find any record of it  - What is it being prescribed for?  - When was his last refill?    Possibility he will need to be seen for follow-up regarding this medication.

## 2018-06-13 RX ORDER — HYDROXYZINE HYDROCHLORIDE 25 MG/1
TABLET, FILM COATED ORAL
Qty: 180 TABLET | Refills: 0 | OUTPATIENT
Start: 2018-06-13

## 2018-06-13 NOTE — TELEPHONE ENCOUNTER
Attempted to reach pt to find out questions below.  Attempted to reach pt at home number listed.  The person that answered the phone stated there was no one at that number with pt's name.  Left message on pt's cell phone to return call to clinic.    Moriah Madrigal RN

## 2018-06-18 NOTE — TELEPHONE ENCOUNTER
Attempted to contact patient - someone answered the phone and said patient no longer lives there.  Attempted to call cell phone number - no answer/no VM.    Address on file is for group home - patient is not at group home anymore so unable to send letter. Multiple attempts made to reach patient. Will close encounter.

## 2018-08-13 ENCOUNTER — TELEPHONE (OUTPATIENT)
Dept: FAMILY MEDICINE | Facility: OTHER | Age: 23
End: 2018-08-13

## 2018-08-13 NOTE — TELEPHONE ENCOUNTER
Panel Management Review    Patient is due/failing the following:   BMP, ALT, CBC, AAP, ACT and LDL    Action needed:   Patient needs office visit for Physical.  Patient needs to do ACT and needs fasting lab only appointment.    Type of outreach:    Phone, left message for patient to call back.     Questions for provider review:    None                                                                                                                                    Laureen Valencia CMA ([facility])       Chart routed to Care Team .    Patient has the following on his problem list:     Asthma review     ACT Total Scores 3/20/2017   ACT TOTAL SCORE -   ASTHMA ER VISITS -   ASTHMA HOSPITALIZATIONS -   ACT TOTAL SCORE (Goal Greater than or Equal to 20) 24   In the past 12 months, how many times did you visit the emergency room for your asthma without being admitted to the hospital? 0   In the past 12 months, how many times were you hospitalized overnight because of your asthma? 0      1. Is Asthma diagnosis on the Problem List? Yes    2. Is Asthma listed on Health Maintenance? Yes    3. Patient is due for:  ACT and AAP      Composite cancer screening  Chart review shows that this patient is due/due soon for the following None  Summary:

## 2018-08-13 NOTE — LETTER
[address]   [facility] [address]  Phone: [phone]  August 20, 2018      Logan Pittman  [address] [facility] [address]      Dear Logan,    We care about your health and have reviewed your health plan including your medical conditions, medications, and lab results.  Based on this review, it is recommended that you follow up regarding the following health topic(s):  -Wellness (Physical) Visit     We recommend you take the following action(s):  -schedule a FOLLOWUP OFFICE APPOINTMENT.  We will perform the following labs:  Lipids (fasting cholesterol - nothing to eat except water and/or meds for 8-10 hours), BMP (basic metabolic panel), ALT/AST and CBC (complete blood cell counts).     Please call us at the [facility] - [phone] (or use Check) to address the above recommendations.     Thank you for trusting [facility] and we appreciate the opportunity to serve you.  We look forward to supporting your healthcare needs in the future.    Healthy Regards,    Your Health Care Team  [facility]

## 2018-08-20 NOTE — TELEPHONE ENCOUNTER
Left message for patient to return call to clinic. When call is returned please see note below and assist in scheduling.     Janel Woodard MA

## 2018-08-20 NOTE — TELEPHONE ENCOUNTER
The number on file is someone else's. I tried to call mom and dad, but lines were busy. I deleted Logan's number since its no longer his.

## 2024-12-31 NOTE — TELEPHONE ENCOUNTER
Requested Prescriptions   Pending Prescriptions Disp Refills     EPINEPHrine (EPIPEN/ADRENACLICK/OR ANY BX GENERIC EQUIV) 0.3 MG/0.3ML injection 2-pack       Sig: Inject 0.3 mLs (0.3 mg) into the muscle once as needed for anaphylaxis    Anaphylaxis Kits Protocol Passed    2017  8:11 AM       Passed - Recent or future visit with authorizing provider's specialty    Patient had office visit in the last year or has a visit in the next 30 days with authorizing provider.  See chart review.              Passed - Patient is age 5 or older        olopatadine (PATANOL) 0.1 % ophthalmic solution 5 mL 11     Sig: Place 1 drop into both eyes 2 times daily    Miscellaneous Opthalmic Allergy Drops Protocol Passed    2017  8:11 AM       Passed - Patient is age 4 or older       Passed - Recent or future visit with authorizing provider's specialty    Patient had office visit in the last year or has a visit in the next 30 days with authorizing provider.  See chart review.             olopatadine (PATANOL) 0.1 % ophthalmic solution  Routing refill request to provider for review/approval because:  A break in medication, last order written 2015 considered  .    EPINEPHrine (EPIPEN) 0.3 MG/0.3ML injection  Routing refill request to provider for review/approval because:  A break in medication, last order written 2015 considered  .    Sadia Choe, RN, BSN            Assistance OOB with selected safe patient handling equipment if applicable/Assistance with ambulation/Communicate risk of Fall with Harm to all staff, patient, and family/Monitor gait and stability/Provide visual cue: red socks, yellow wristband, yellow gown, etc/Reinforce activity limits and safety measures with patient and family/Bed in lowest position, wheels locked, appropriate side rails in place/Call bell, personal items and telephone in reach/Instruct patient to call for assistance before getting out of bed/chair/stretcher/Non-slip footwear applied when patient is off stretcher/[place] to call system/Physically safe environment - no spills, clutter or unnecessary equipment/Purposeful Proactive Rounding/Room/bathroom lighting operational, light cord in reach